# Patient Record
Sex: FEMALE | Race: WHITE | HISPANIC OR LATINO | Employment: UNEMPLOYED | URBAN - METROPOLITAN AREA
[De-identification: names, ages, dates, MRNs, and addresses within clinical notes are randomized per-mention and may not be internally consistent; named-entity substitution may affect disease eponyms.]

---

## 2017-08-15 ENCOUNTER — NON-PROVIDER VISIT (OUTPATIENT)
Dept: OBGYN | Facility: CLINIC | Age: 32
End: 2017-08-15

## 2017-08-15 DIAGNOSIS — Z32.01 POSITIVE PREGNANCY TEST: ICD-10-CM

## 2017-08-15 LAB
INT CON NEG: NEGATIVE
INT CON POS: POSITIVE
POC URINE PREGNANCY TEST: POSITIVE

## 2017-08-15 PROCEDURE — 81025 URINE PREGNANCY TEST: CPT | Performed by: OBSTETRICS & GYNECOLOGY

## 2017-09-14 ENCOUNTER — HOSPITAL ENCOUNTER (EMERGENCY)
Facility: MEDICAL CENTER | Age: 32
End: 2017-09-15
Attending: EMERGENCY MEDICINE

## 2017-09-14 DIAGNOSIS — Z33.1 PREGNANT STATE, INCIDENTAL: ICD-10-CM

## 2017-09-14 DIAGNOSIS — K08.89 PAIN, DENTAL: ICD-10-CM

## 2017-09-14 PROCEDURE — 700102 HCHG RX REV CODE 250 W/ 637 OVERRIDE(OP): Performed by: EMERGENCY MEDICINE

## 2017-09-14 PROCEDURE — A9270 NON-COVERED ITEM OR SERVICE: HCPCS | Performed by: EMERGENCY MEDICINE

## 2017-09-14 PROCEDURE — 99284 EMERGENCY DEPT VISIT MOD MDM: CPT

## 2017-09-14 RX ORDER — AMOXICILLIN 500 MG/1
500 CAPSULE ORAL 3 TIMES DAILY
Qty: 30 CAP | Refills: 0 | Status: SHIPPED | OUTPATIENT
Start: 2017-09-14

## 2017-09-14 RX ORDER — AMOXICILLIN 500 MG/1
500 CAPSULE ORAL ONCE
Status: COMPLETED | OUTPATIENT
Start: 2017-09-15 | End: 2017-09-14

## 2017-09-14 RX ADMIN — AMOXICILLIN 500 MG: 500 CAPSULE ORAL at 23:59

## 2017-09-15 VITALS
OXYGEN SATURATION: 97 % | BODY MASS INDEX: 37.5 KG/M2 | RESPIRATION RATE: 16 BRPM | HEART RATE: 82 BPM | HEIGHT: 63 IN | WEIGHT: 211.64 LBS | DIASTOLIC BLOOD PRESSURE: 72 MMHG | TEMPERATURE: 97.2 F | SYSTOLIC BLOOD PRESSURE: 110 MMHG

## 2017-09-15 NOTE — ED PROVIDER NOTES
"ED Provider Note    CHIEF COMPLAINT  Chief Complaint   Patient presents with   • Tooth Ache     LT upper molar pain x4 days. Took 2 PO tabs ampicilin at home today. She is 23 wks pregnant.       HPI  Kiet Monge is a 31 y.o. female who presentsFor evaluation of 4 days of left-sided upper dental pain, no trauma, no fever, no drainage. She took to ampicillin tablets that she happened to have at home and then presents here. Patient is pregnant, no abdominal pain or vaginal bleeding or discharge or any pregnancy related complaints. No neck pain or stiffness. No other complaints.    REVIEW OF SYSTEMS  Negative for fever, neck pain.    PAST MEDICAL HISTORY   has a past medical history of Pyelonephritis complicating pregnancy (2000).    SOCIAL HISTORY  Social History     Social History Main Topics   • Smoking status: Never Smoker   • Smokeless tobacco: Never Used   • Alcohol use No   • Drug use: No   • Sexual activity: Yes     Partners: Male      Comment: None       SURGICAL HISTORY  patient denies any surgical history    CURRENT MEDICATIONS  I personally reviewed the medication list in the charting documentation.     ALLERGIES  No Known Allergies    PHYSICAL EXAM  VITAL SIGNS: /69   Pulse 79   Temp 36.2 °C (97.2 °F) (Temporal)   Resp 16   Ht 1.6 m (5' 3\")   Wt 96 kg (211 lb 10.3 oz)   LMP 04/08/2017   SpO2 96%   Breastfeeding? No   BMI 37.49 kg/m²   Constitutional: Alert in no apparent distress.  HENT:Relatively normal-appearing dentition without significant decay, tenderness on percussion of the left maxillary molars, no obvious abscess.   Eyes: Conjunctiva normal, Non-icteric.   Chest: Normal nonlabored respirations  Skin: No erythema, No rash.   Musculoskeletal: Good range of motion in all major joints.   Neurologic: Alert, No focal deficits noted.   Psychiatric: Affect normal, Judgment normal.    COURSE & MEDICAL DECISION MAKING  Pertinent Labs & Imaging studies reviewed. (See chart for " details)    Encounter Summary: This is a 31 y.o. female with dental pain without a drainable abscess, incidentally pregnant. Will treat with amoxicillin, for pain control and instructed her to take Tylenol only. We'll provide her with a list of dental clinics, stable and appropriate for discharge at this time.      DISPOSITION: Discharge Home      FINAL IMPRESSION  1. Pain, dental    2. Pregnant state, incidental        This dictation was created using voice recognition software. The accuracy of the dictation is limited to the abilities of the software. I expect there may be some errors of grammar and possibly content. The nursing notes were reviewed and certain aspects of this information were incorporated into this note.    Electronically signed by: Paulino Palomares, 9/14/2017 11:56 PM

## 2017-09-15 NOTE — ED NOTES
"Chief Complaint   Patient presents with   • Tooth Ache     LT upper molar pain x4 days. Took 2 PO tabs ampicilin at home today. She is 23 wks pregnant.     Pt amb to triage with above.   No broken teeth noted, dental caries observed.   Unsure of fevers at home.   Pt returned to lobby. Educated on triage process and to inform staff of any changes. VSS.  /69   Pulse 79   Temp 36.2 °C (97.2 °F) (Temporal)   Resp 16   Ht 1.6 m (5' 3\")   Wt 96 kg (211 lb 10.3 oz)   LMP 04/20/2015   SpO2 96%   BMI 37.49 kg/m²       "

## 2017-09-15 NOTE — DISCHARGE INSTRUCTIONS
Dolor dental  (Dental Pain)  Las causas del dolor dental pueden ser muchas, entre ellas, las siguientes:  · Caries. La caries hace que el nervio del diente esté expuesto al aire y a las temperaturas calientes o frías. [place] puede causar dolor o molestias.  · Infección o absceso. Un absceso dental es un área llena de pus infectado debido a dez infección bacteriana en la parte interna del diente (pulpa). Por lo general, se produce en el extremo de la raíz del diente.  · Lesiones.  · Dez razón desconocida (idiopática).  El dolor puede ser leve o intenso. El dolor quizás aparezca solamente en estas ocasiones:  · Al masticar.  · Al estar expuesto a dez temperatura caliente o fría.  · Al comer alimentos o chacho bebidas con azúcar, por ejemplo, gaseosas o caramelos.  El dolor también puede ser bertha.  INSTRUCCIONES PARA EL CUIDADO EN EL HOGAR  Controle el dolor dental a fin de detectar algún cambio. Las siguientes medidas pueden servir para aliviar cualquier molestia que esté sintiendo:  · [place] los medicamentos solamente osbaldo se lo haya indicado el dentista.  · Si le recetaron antibióticos, asegúrese de terminarlos, incluso si comienza a sentirse mejor.  · Concurra a todas las visitas de control osbaldo se lo haya indicado el dentista. [place] es importante.  · No se aplique calor en la parte externa de la masoud.  · Si el dentista se lo ha indicado, enjuáguese la boca o kevan gárgaras con agua salada. [place] ayuda a aliviar el dolor y reducir la hinchazón.  ¨ Puede preparar agua salada agregando ¼ de cucharadita de sal en 1 taza de agua templada.  · Aplíquese hielo sobre la angela dolorida de la masoud:  ¨ Ponga el hielo en dez bolsa plástica.  ¨ Coloque dez toalla entre la piel y la bolsa de hielo.  ¨ Coloque el hielo barbara 20 minutos, 2 a 3 veces por día.  · Evite los alimentos o las bebidas que le causen dolor, osbaldo los siguientes:  ¨ Alimentos o bebidas muy calientes o muy fríos.  ¨ Alimentos o bebidas dulces o con  azúcar.  SOLICITE ATENCIÓN MÉDICA SI:  · El dolor no se ricardo con los medicamentos.  · Los síntomas empeoran.  · Aparecen nuevos síntomas.  SOLICITE ATENCIÓN MÉDICA DE INMEDIATO SI:  · No puede abrir la boca.  · Tiene problemas para respirar o tragar.  · Tiene fiebre.  · Tiene hinchazón en la masoud, el otis o la mandíbula.     Esta información no tiene osbaldo fin reemplazar el consejo del médico. Asegúrese de hacerle al médico cualquier pregunta que tenga.     Document Released: 12/18/2006 Document Revised: 05/03/2016  [employer] Interactive Patient Education ©2016 [employer]        Embarazo y medicamentos  (Pregnancy and Medications)  La mayor parte de las veces, los medicamentos que tanesha dez miguel embarazada no pasan al feto, nancy otras veces sí lo hacen. [place] puede causar lesiones o defectos congénitos. El riesgo de lesiones al feto es mayor en las primeras semanas del embarazo. En bushra momento es cuando se desarrollan los órganos principales. Si usted está tomando medicamentos prescriptos, de venta rayshawn o hierbas medicinales, es mejor que consulte a galvan médico antes de quedar embarazada. Si queda embarazada, deje de chacho los medicamentos de venta rayshawn y las hierbas medicinales inmediatamente. Comente con galvan médico lo que está tomando. También coméntele qué medicamentos ha tomado antes de saber que estaba embarazada. Nunca tome otros medicamentos daniel el embarazo, excepto que galvan médico la autorice.  Otras sustancias osbaldo cafeína, vitaminas, tés y hierbas medicinales pueden afectar el desarrollo del feto. [place] con galvan médico acerca de disminuir la cantidad de cafeína. También consulte qué tipo de vitaminas necesita recibir. Nunca use hierbas medicinales sin consultar a galvan médico.   MEDICAMENTOS CUYO USO NO ES SEGURO DANIEL EL EMBARAZO  · Categoría A  medicamentos cuya seguridad chatterjee sido probada daniel el embarazo y se chatterjee hallado que son seguros. [place] incluye medicamentos osbaldo:  · Ácido fólico  · Vitamina  B6  · Medicamentos para la tiroides con moderación o en dosis prescriptas.  · Categoría B  medicamentos que se morgan utilizado barbara el embarazo y no parecen causar defectos congénitos u otros problemas. [place] incluye medicamentos osbaldo:  · Algunos antibióticos  · Acetaminofeno (Tylenol ®).  · Aspartamo (endulzante artificial).  · Famotidina (Pepcid®).  · Prednisona (corticoides).  · Insulina (para la diabetes).  · Ibuprofeno (Advil®, Motrin®) antes del tercer trimestre. Las mujeres embarazadas no deben chacho ibuprofeno barbara los últimos gabi meses del embarazo.  · Categoría C  medicamentos que probablemente causen problemas a la madre o al feto. También incluye medicamentos cuyos estudios para verificar galvan seguridad no se morgan concluido. No hay estudios en curso para la mayoría de estos medicamentos. Estos medicamentos generalmente traen dez advertencia, por lo que sólo deben utilizarse si los beneficios son mayores que los riesgos. [place] es algo que dez miguel debe analizar cuidadosamente con galvan médico. Estos fármacos son:  · Proclorperzaina (Compazine®).  · Sudafed®.  · Fluconazol (Diflucan®).  · Ciprofloxacin (Cipro®).  · También se incluye en bushra анна a algunos antidepresivos.  · Categoría D  sustancias que claramente implican riesgos para la christoph del feto. Ellas son:  · Alcohol  · Litio (usado para tratar trastornos maníacodepresivos).  · Phenytoin (Dilantin®).  · La mayoría de la quimioterapia que se utiliza para el tratamiento del cáncer. En algunos casos, las drogas para la quimioterapia se administran barbara el embarazo.  · Categoría X  sustancias que morgan demostrado causar defectos congénitos. Nunca deben tomarse barbara el embarazo. Ellas son:  · Medicamentos para tratar problemas de la piel, osbaldo el acne quístico (Accutane®) y la psoriasis (Tegison® or Soriatane®).  · Talidomida  · Dietilestilbestrol o JENNY.  Ningún medicamento es considerado 100% seguro para ser utilizado barbara el embarazo, debido a  que cada persona reacciona de manera diferente.  No se recomienda daniel el embarazo el consumo de aspirina y otros medicamentos que contengan salicilato, especialmente daniel los últimos gabi meses, debido a que pueden causar hemorragias. En algunos casos raros, el médico podrá indicar estos medicamentos, bajo estrecha vigilancia. El acetilsalicilato es un ingrediente común en muchos analgésicos de venta rayshawn, y puede hacer que un embarazo dure más. Puede causar hemorragias graves antes y después del parto.   ¿SELVIN EVITAR HORTENSIA CUALQUIER MEDICAMENTO DANIEL EL EMBARAZO?   Si debe continuar o no tomando fernando medicamentes daniel el embarazo es tata cuestión seria. Sin embargo, si suspende el uso de los medicamentos que necesita, podría dañarse usted y el bebé. [place] con galvan médico para saber si los beneficios son mayores que los riesgos para usted y galvan bebé.   Las mujeres embarazadas y las que amamantan que necesitan medicamentos para enfermedades psiquiátricas deben consultar galvan obstetra, con el pediatra y con el profesional de la christoph mental antes de hortensia cualquier medicamento.  MEDICAMENTOS NATURALES O HIERBAS MEDICINALES DANIEL EL EMBARAZO  Aunque se hazel que algunas que medicinales pueden beneficiar daniel el embarazo, no existen estudios que prueben esta afirmación. Del mismo modo, existen muy pocos estudios sobre la seguridad y la efectividad de las hierbas medicinales daniel el embarazo. Nunca use hierbas medicinales sin consultar a galvan médico. Estos productos pueden contener agentes que le traigan problemas a usted y al feto en desarrollo. Pueden también causar problemas al embarazo.   Si hazel que galvan madre recibió dietilestilbesterol (JENNY), un estrógeno sintético, cuando estaba embarazada de usted, comuníquelo a galvan médico. Pregúntele:  · Qué tipo de análisis necesita.  · Con qué frecuencia debe realizarlos.  · Todo lo necesario para asegurarse que no tendrá ningún problema.  [place] miguel cuya madre  recibió JENNY daniel el embarazo debe ser controlada y evaluada para detectar anormalidades en fernando órganos femeninos daniel toda galvan amina.  MEDICAMENTOS DE VENTA BLADIMIR QUE SON SEGUROS PARA HORTENSIA DANIEL EL EMBARAZO:  Todo medicamento que se reciba daniel el embarazo debe ser siempre autorizado por el médico.  · Alergias: (Benadryl®).  · Resfríos y gripe: Tylenol (acetaminofeno). Tynenol Cold, gárgaras de agua tibia con sal, gotas nasales de solución salina.  · Constipación: (Metamucil®, Citrucil®, Fiberall/Fobricon, Colace®, Leche de Magnesia® , Senekot®).  · Diarrea (Kaopectate®, Immodium®, Parepectolin). No debe tomarlos en el primer trimestre del embarazo y sólo daniel 24 horas. Comuníquese con galvan médico si aún tiene diarrea.  · Dolor de sandra: (Tylenol).  · Ungüentos para moreno y raspaduras: (J & J, Bacitracin®, Neosporin®).  · Acidez: (Tums®, Riopan®, titralac , Gaviscon).  · Hemorroides: (Preparation H®, anusol, tucks®, Witch hazel).  · Náuseas y vómitos (Vitamina B6 comprimidos de 100mg, emetrol si no es diabética, Emetrex, Sea Bands).  · Erupciones (hidrocortisona en crema o ungüento, caladryl en loción o crema, benadryl en crema o cápsulas, shaq de asif).  · Infecciones en la vagina por hongos (Monistat® crema o comprimidos, Terazol® crema).  PARA OBTENER MÁS INFORMACIÓN  Para obtener más información relacionada con los medicamentos que tanesha y osbaldo pueden afectar galvan embarazo, visite la página web: http://www.drugs.com/drug_information.html  *Parte de esta información se basa en estudiso realizados por The Food and Drug Administration (FDA).  Document Released: 06/05/2009 Document Revised: 03/11/2013  [employer]® Patient Information ©2014 [employer].

## 2017-09-21 ENCOUNTER — INITIAL PRENATAL (OUTPATIENT)
Dept: OBGYN | Facility: CLINIC | Age: 32
End: 2017-09-21

## 2017-09-21 ENCOUNTER — HOSPITAL ENCOUNTER (OUTPATIENT)
Facility: MEDICAL CENTER | Age: 32
End: 2017-09-21
Attending: PHYSICIAN ASSISTANT
Payer: COMMERCIAL

## 2017-09-21 VITALS
WEIGHT: 209 LBS | SYSTOLIC BLOOD PRESSURE: 168 MMHG | HEIGHT: 63 IN | BODY MASS INDEX: 37.03 KG/M2 | DIASTOLIC BLOOD PRESSURE: 64 MMHG

## 2017-09-21 DIAGNOSIS — Z34.82 ENCOUNTER FOR SUPERVISION OF OTHER NORMAL PREGNANCY IN SECOND TRIMESTER: ICD-10-CM

## 2017-09-21 LAB
APPEARANCE UR: NORMAL
BILIRUB UR STRIP-MCNC: NORMAL MG/DL
COLOR UR AUTO: NORMAL
GLUCOSE UR STRIP.AUTO-MCNC: NEGATIVE MG/DL
KETONES UR STRIP.AUTO-MCNC: NEGATIVE MG/DL
LEUKOCYTE ESTERASE UR QL STRIP.AUTO: NORMAL
NITRITE UR QL STRIP.AUTO: NEGATIVE
PH UR STRIP.AUTO: 6.5 [PH] (ref 5–8)
PROT UR QL STRIP: NORMAL MG/DL
RBC UR QL AUTO: NEGATIVE
SP GR UR STRIP.AUTO: 1.02
UROBILINOGEN UR STRIP-MCNC: NORMAL MG/DL

## 2017-09-21 PROCEDURE — 81002 URINALYSIS NONAUTO W/O SCOPE: CPT | Performed by: PHYSICIAN ASSISTANT

## 2017-09-21 PROCEDURE — 59401 PR NEW OB VISIT: CPT | Performed by: PHYSICIAN ASSISTANT

## 2017-09-21 PROCEDURE — 8198 PREG CTR PKG RATE (SYSTEM): Performed by: PHYSICIAN ASSISTANT

## 2017-09-21 NOTE — PROGRESS NOTES
New ob visit. Pt sure of LMP. Pt has hx of 4  at term without complications. No GDM, PIH or delivery complications. Pt denies PMHx, Shx, though notes she had some depression with last pregnancy only. No tob, etoh or drug use. Taking PNV only. Pt currently denies cramping, bleeding or pain, though pt has discomfort in RLQ, likely due to too much activity and muscle strain. No N/V or constipation, diarrhea. +FM.     PE: See below. Size c/w dates. +FHT by Doppler    A/P: 1. IUP at 23w5d - PAP, GC/CT today. PNL, 1hr GTT slip given. US next avail. RTC 4 wks.  2. Late entry prenatal care

## 2017-09-21 NOTE — LETTER
Estudios Para Detectar los Portadores de la Fibrosis Quística   (La Enfermedad Fibroquística del Páncreas)    [facility]    Esta información esta relacionada con un examen de xavier que puede determinar si usted o galvan nikki es portador del gen que causa la enfermedad hereditaria que se llama la fibrosis quística.     ¿QUE ES LA ENFERMEDAD FIBROSIS QUÍSTICA?  · La  fibrosis quística es dez enfermedad hereditaria que afecta a más de 25,000 niños y jóvenes adultos americanos.  · Los síntomas de la fibrosis quística varían de dez persona a otra.  Los síntomas más comunes son congestión pulmonar, diarrea y retraso en el crecimiento del emily.  La mayoría de las personas con la fibrosis quística padecen de problemas médicos muy severos, y algunas mueren jóvenes.  Otras tienen tan pocos síntomas que no se percatan de que tienen fibrosis quística.  · La fibrosis quística no afecta en absoluto la inteligencia de la persona.  · Aunque actualmente no hay dez mega para la fibrosis quística, los científicos están avanzando con respecto a nuevos tratamientos y en la búsqueda de la mega.  Anteriormente la mayoría de las personas que padecían de fibrosis quística morían muy jóvenes.  Hoy en día, muchas personas que padecen de la fibrosis quística sobreviven hasta los 20 o 30 anos de edad.    ¿EXISTE LA POSIBILIDAD DE QUE MI YOBANY PUEDA TENER FIBROSIS QUÍSTICA?  · Usted puede tener un hijo con la fibrosis quística aun cuando no hay nadie en galvan yamil que la padezca.  ([place] la grafica que sigue.)  · Existe dez prueba que puede ayudarle a determinar si abel un gen para la fibrosis quística y si por eso corre un riesgo de tener un hijo con la enfermedad.  · Si ambos padres son portadores del gen para la fibrosis quística, hay dez (1) probabilidad entre 4 (25%) en cada embarazo, de que puedan tener un hijo afectada con fibrosis quística.  · Los portadores del gen para la fibrosis quística tienen dez copia del gen normal y  el otro gen alterado.  · Las personas con fibrosis quística tienen dos genes alterados para la fibrosis quística,  · Normalmente la mayoría de individuos tienen dos copias normales del gen para fibrosis quística.    Riesgo aproximado de que dez nikki sin familiares con fibrosis quística pueda tener un hijo con fibrosis quística:  Origen / Riesgo  Dez nikki Caucásica (de kendall vikas):  1 en 2,500  Dez nikki :  1 en 8,000  Dez nikki Afroamericana (de kendall kasey):  1 en 15,000  Dez nikki Asiática:  1 en 32,000    ¿EXISTEN PRUEBAS PARA DETECTAR LOS PORTADORES DE LA FIBROSIS QUÍSTICA?  · Hay dez prueba de xavier para determinar si usted o galvan nikki portan el gen para la fibrosis quística.  · Es importante entender que la prueba no detecta todos los portadores del gen para la fibrosis quística.  · Si la prueba determina que ambos padres con portadores, se puede realizar otras pruebas para determinar si galvan futuro hector esta afectado.    ¿CUANTO JAY LA PRUEBA PARA DETERMINAR SI SOY PORTADOR(A) DEL GEN PARA LA FIBROSIS QUÍSTICA?  · El costo de la prueba varia según galvan póliza de seguro medico y el laboratorio donde se efectúa el análisis.  · El costo promedio de la prueba es de $780.00  · Galvan consejera genética puede darle mas información acera de esta prueba para determinar si usted es portador de la fibrosis quística.    _____  Si, yo estoy interesada y me gustaria obtener mas información al respecto.  _____  No tengo interés ni en hacerme la prueba para determinar si soy portador(a) de gen para la fibrosis quística ni en recibir mas información al respecto.    Firma del paciente: _____________________________   9/21/2017

## 2017-09-21 NOTE — PROGRESS NOTES
Pt here for New OB today  Phone: [phone]  Pharmacy verified  Last pap/result:  Pt is taking PNV  Desires BTL  Pt c/o pelvic pain and Hendry Ames  Reports good FM, denies any VB and LOF   Pt c/o cough and running nose since yesterday; remedy list given; denies fever  Flu shot offered

## 2017-09-23 LAB
C TRACH DNA GENITAL QL NAA+PROBE: NEGATIVE
CYTOLOGY REG CYTOL: NORMAL
N GONORRHOEA DNA GENITAL QL NAA+PROBE: NEGATIVE
SPECIMEN SOURCE: NORMAL

## 2017-09-27 ENCOUNTER — APPOINTMENT (OUTPATIENT)
Dept: RADIOLOGY | Facility: IMAGING CENTER | Age: 32
End: 2017-09-27
Attending: PHYSICIAN ASSISTANT

## 2017-09-27 DIAGNOSIS — Z34.82 ENCOUNTER FOR SUPERVISION OF OTHER NORMAL PREGNANCY IN SECOND TRIMESTER: ICD-10-CM

## 2017-09-27 PROCEDURE — 76805 OB US >/= 14 WKS SNGL FETUS: CPT | Mod: 26 | Performed by: OBSTETRICS & GYNECOLOGY

## 2017-10-19 ENCOUNTER — ROUTINE PRENATAL (OUTPATIENT)
Dept: OBGYN | Facility: CLINIC | Age: 32
End: 2017-10-19

## 2017-10-19 VITALS — BODY MASS INDEX: 37.73 KG/M2 | WEIGHT: 213 LBS | DIASTOLIC BLOOD PRESSURE: 60 MMHG | SYSTOLIC BLOOD PRESSURE: 116 MMHG

## 2017-10-19 DIAGNOSIS — Z34.82 ENCOUNTER FOR SUPERVISION OF NORMAL PREGNANCY IN MULTIGRAVIDA IN SECOND TRIMESTER: Primary | ICD-10-CM

## 2017-10-19 PROCEDURE — 90040 PR PRENATAL FOLLOW UP: CPT | Performed by: NURSE PRACTITIONER

## 2017-10-19 PROCEDURE — 90472 IMMUNIZATION ADMIN EACH ADD: CPT | Performed by: NURSE PRACTITIONER

## 2017-10-19 PROCEDURE — 90686 IIV4 VACC NO PRSV 0.5 ML IM: CPT | Performed by: NURSE PRACTITIONER

## 2017-10-19 PROCEDURE — 90471 IMMUNIZATION ADMIN: CPT | Performed by: NURSE PRACTITIONER

## 2017-10-19 PROCEDURE — 90715 TDAP VACCINE 7 YRS/> IM: CPT | Performed by: NURSE PRACTITIONER

## 2017-10-19 NOTE — PROGRESS NOTES
OB f/u. + fetal movement.  No VB, LOF or UC's.  Good phone # [phone]  Preferred pharmacy confirmed.  1 hr GTT and PN Labs not done yet, order reprinted and given to patient  Kick count sheet given today.  Desires Flu Shot and TDap  Desires BTL

## 2017-10-19 NOTE — PROGRESS NOTES
TDap given to patient on L  Deltoid. Screening checklist reviewed with patient. VIS given    Flu Shot given to patient on R  Deltoid. Screening checklist reviewed with patient. VIS given

## 2017-10-19 NOTE — LETTER
Cuente los Movimientos de galvan Bebé  Otro paso importante para la christoph de galvan bebé    Kiet JanuszPollack     [facility]            Dept: [phone]    ¿Cuántas semanas tiene de embarazo? 27w5d    Fecha cuando tiene que comenzar a contar el movimiento: 10/21/17                  [place] debe usar bushra diagrama    Dez manera en que galvan doctor puede controlar a christoph de galvan bebé es sabiendo cuantas veces se mueve galvan bebé en el útero, o por medio de las “pataditas”.  Usted podrá ayudarle a galvan médico al usar cada día el siguiente diagrama.    Cada día, usted debe prestar atención a cuantas horas le lleva a galvan bebé moverse 10 veces.  Comience a contar en la mañana, lo antes posible después de haberse levantado.    · Primeramente, escriba la hora en que se mueve galvan bebé, hasta llegar a 10 veces.  · Colóquele un check o palomita a cada cuadrito cada vez que galvan bebé se mueva hasta que complete 10 veces.  · Escriba la hora cuando termine de contar 10 veces en la última columna.  · Sume el total del tiempo que le llevó contar los 10 movimientos.  · Finalmente, complete el cuadrito de cuantas horas le llevó hacerlo.    Después de dalia contado los 10 movimientos, ya no tendrá que contar los demás movimientos por el tova del día.  A la mañana siguiente, comience a contar de nuevo cuantas veces se mueve el bebé desde el momento en que se levante.    ¿Qué tendría que considerarse un “movimiento”?  Es difícil de decirlo porque es distinto de dez madre a otra, y de un embarazo a otro.  Lo importante es que cuente el movimiento de la misma manera barbara el transcurso de galvan embarazo.  Si tiene preguntas adicionales, pregúntele a galvan doctor.    ¡Cuente cuidadosamente cada día!     MUESTRA:  Semana 28    ¿Cuántas horas le ha llevado sentir 10 movimientos?        Hora de Inicio     1     2     3     4     5     6     7     8     9     10   Hora de Finlizar   Jaylyn. 8:20 ·  ·  ·  ·  ·  ·  ·  ·  ·  ·  11:40   Mar.               Mié.                Jue.               Vie.               Sáb.               Dom.                 IMPORTANTE:  Usted debe contactar a galvan doctor si le lleva más de 2 horas sentir 10 movimientos de galvan bebé.    Cada mañana, escriba la hora de inicio y comience a contar los movimientos de galvan bebé.  Hágalo colocándole un check o palomita a cada cuadrito cada vez que sienta un movimiento de galvan bebé.  Cuando haya sentido 10 “pataditas”, escriba la hora en que terminó de contar en la última columna.  Luego, complete en la cajita (arriba de la delio de check o palomita) el número total de horas que le llevó hacerlo.  Asegúrese de leer completamente las instrucciones en la página anterior.

## 2017-10-19 NOTE — PROGRESS NOTES
S:  Pt is  at 27w5d for routine OB follow up.  No concerns today.  Reports good FM.  Denies VB, LOF, RUCs or vaginal DC. Has not gotten pnls or 1 hr GTT done, pt understands she needs to get done before next visit    O:  Please see above vitals.        FHTs: 138        Fundal ht: 27 cm.        S=D        Complete OB US: normal fetal survaey.    A:  IUP at 27w5d  Patient Active Problem List    Diagnosis Date Noted   • Supervision of normal pregnancy 2011        P:  1.  Desires BTL.          2.  Instructions given on FKCs.          3.  Questions answered.          4.  Encouraged pt to tour L&D.          5.  Encourage adequate water intake.        6.   labor precautions reviewed.         7.  F/u 2 wks.        8.  TDap and flu vaccine today.        9.  Lab slips reprinted and given to pt.

## 2017-10-20 ENCOUNTER — HOSPITAL ENCOUNTER (OUTPATIENT)
Dept: LAB | Facility: MEDICAL CENTER | Age: 32
End: 2017-10-20
Attending: PHYSICIAN ASSISTANT
Payer: COMMERCIAL

## 2017-10-20 DIAGNOSIS — Z34.82 ENCOUNTER FOR SUPERVISION OF OTHER NORMAL PREGNANCY IN SECOND TRIMESTER: ICD-10-CM

## 2017-10-20 LAB
ABO GROUP BLD: NORMAL
APPEARANCE UR: ABNORMAL
BACTERIA #/AREA URNS HPF: ABNORMAL /HPF
BASOPHILS # BLD AUTO: 0.2 % (ref 0–1.8)
BASOPHILS # BLD: 0.02 K/UL (ref 0–0.12)
BILIRUB UR QL STRIP.AUTO: NEGATIVE
BLD GP AB SCN SERPL QL: NORMAL
COLOR UR: YELLOW
CULTURE IF INDICATED INDCX: YES UA CULTURE
EOSINOPHIL # BLD AUTO: 0.05 K/UL (ref 0–0.51)
EOSINOPHIL NFR BLD: 0.5 % (ref 0–6.9)
EPI CELLS #/AREA URNS HPF: ABNORMAL /HPF
ERYTHROCYTE [DISTWIDTH] IN BLOOD BY AUTOMATED COUNT: 44.1 FL (ref 35.9–50)
GLUCOSE 1H P 50 G GLC PO SERPL-MCNC: 104 MG/DL (ref 70–139)
GLUCOSE UR STRIP.AUTO-MCNC: NEGATIVE MG/DL
HBV SURFACE AG SER QL: NEGATIVE
HCT VFR BLD AUTO: 37.4 % (ref 37–47)
HGB BLD-MCNC: 12.2 G/DL (ref 12–16)
HIV 1+2 AB+HIV1 P24 AG SERPL QL IA: NON REACTIVE
HYALINE CASTS #/AREA URNS LPF: ABNORMAL /LPF
IMM GRANULOCYTES # BLD AUTO: 0.03 K/UL (ref 0–0.11)
IMM GRANULOCYTES NFR BLD AUTO: 0.3 % (ref 0–0.9)
KETONES UR STRIP.AUTO-MCNC: 80 MG/DL
LEUKOCYTE ESTERASE UR QL STRIP.AUTO: ABNORMAL
LYMPHOCYTES # BLD AUTO: 1.2 K/UL (ref 1–4.8)
LYMPHOCYTES NFR BLD: 12.4 % (ref 22–41)
MCH RBC QN AUTO: 29.4 PG (ref 27–33)
MCHC RBC AUTO-ENTMCNC: 32.6 G/DL (ref 33.6–35)
MCV RBC AUTO: 90.1 FL (ref 81.4–97.8)
MICRO URNS: ABNORMAL
MONOCYTES # BLD AUTO: 0.4 K/UL (ref 0–0.85)
MONOCYTES NFR BLD AUTO: 4.1 % (ref 0–13.4)
MUCOUS THREADS #/AREA URNS HPF: ABNORMAL /HPF
NEUTROPHILS # BLD AUTO: 7.94 K/UL (ref 2–7.15)
NEUTROPHILS NFR BLD: 82.5 % (ref 44–72)
NITRITE UR QL STRIP.AUTO: NEGATIVE
NRBC # BLD AUTO: 0 K/UL
NRBC BLD AUTO-RTO: 0 /100 WBC
PH UR STRIP.AUTO: 6 [PH]
PLATELET # BLD AUTO: 275 K/UL (ref 164–446)
PMV BLD AUTO: 11.5 FL (ref 9–12.9)
PROT UR QL STRIP: NEGATIVE MG/DL
RBC # BLD AUTO: 4.15 M/UL (ref 4.2–5.4)
RBC # URNS HPF: ABNORMAL /HPF
RBC UR QL AUTO: NEGATIVE
RH BLD: NORMAL
RUBV AB SER QL: 32.3 IU/ML
SP GR UR STRIP.AUTO: 1.03
TREPONEMA PALLIDUM IGG+IGM AB [PRESENCE] IN SERUM OR PLASMA BY IMMUNOASSAY: NON REACTIVE
UROBILINOGEN UR STRIP.AUTO-MCNC: 0.2 MG/DL
WBC # BLD AUTO: 9.6 K/UL (ref 4.8–10.8)
WBC #/AREA URNS HPF: ABNORMAL /HPF

## 2017-10-22 LAB
BACTERIA UR CULT: NORMAL
SIGNIFICANT IND 70042: NORMAL
SOURCE SOURCE: NORMAL

## 2017-11-03 ENCOUNTER — ROUTINE PRENATAL (OUTPATIENT)
Dept: OBGYN | Facility: CLINIC | Age: 32
End: 2017-11-03

## 2017-11-03 VITALS — SYSTOLIC BLOOD PRESSURE: 106 MMHG | DIASTOLIC BLOOD PRESSURE: 68 MMHG | BODY MASS INDEX: 37.91 KG/M2 | WEIGHT: 214 LBS

## 2017-11-03 DIAGNOSIS — Z34.80 SUPERVISION OF OTHER NORMAL PREGNANCY, ANTEPARTUM: ICD-10-CM

## 2017-11-03 PROCEDURE — 90040 PR PRENATAL FOLLOW UP: CPT | Performed by: NURSE PRACTITIONER

## 2017-11-17 ENCOUNTER — ROUTINE PRENATAL (OUTPATIENT)
Dept: OBGYN | Facility: CLINIC | Age: 32
End: 2017-11-17

## 2017-11-17 VITALS — SYSTOLIC BLOOD PRESSURE: 108 MMHG | WEIGHT: 211 LBS | DIASTOLIC BLOOD PRESSURE: 62 MMHG | BODY MASS INDEX: 37.38 KG/M2

## 2017-11-17 DIAGNOSIS — Z34.93 ENCOUNTER FOR SUPERVISION OF NORMAL PREGNANCY IN THIRD TRIMESTER, UNSPECIFIED GRAVIDITY: ICD-10-CM

## 2017-11-17 PROCEDURE — 90040 PR PRENATAL FOLLOW UP: CPT | Performed by: NURSE PRACTITIONER

## 2017-11-17 NOTE — PROGRESS NOTES
SUBJECTIVE:  Pt is a 32 y.o.   at 31w6d  gestation. Presents today for follow-up prenatal care. Reports no issues at this time.  Reports good  fetal movement. Denies cramping/contractions, bleeding or leaking of fluid. Denies dysuria, headaches, N/V, or other issues at this time. Generally feels well today.     OBJECTIVE:  - See prenatal vitals flow  Vitals:    17 1055   BP: 108/62   Weight: 95.7 kg (211 lb)               ASSESSMENT:   - IUP at 31w6d by LMP which is consistent with 23 week US   - S=D  Patient Active Problem List    Diagnosis Date Noted   • Supervision of normal pregnancy 2011         PLAN:  - S/sx pregnancy and labor warning signs vs general discomforts discussed  - Fetal movements and kick counts reviewed   - Adequate hydration reinforced  - Nutrition/exercise/vitamin education: continued PNV  - Plans for breast and formula feeding  - Plans BTL/LARC for contraception Pp: handout given and reviewed  - S/p TDAP vacc  - S/p Flu vacc  - Anticipatory guidance given  - RTC in 2 weeks for follow-up prenatal care

## 2017-11-17 NOTE — PROGRESS NOTES
OB f/u. + fetal movement.  No VB, LOF or UC's.  Good phone # [phone]  Preferred pharmacy confirmed  Pt reports no problems at this time

## 2017-12-01 ENCOUNTER — ROUTINE PRENATAL (OUTPATIENT)
Dept: OBGYN | Facility: CLINIC | Age: 32
End: 2017-12-01

## 2017-12-01 VITALS — WEIGHT: 215 LBS | SYSTOLIC BLOOD PRESSURE: 114 MMHG | BODY MASS INDEX: 38.09 KG/M2 | DIASTOLIC BLOOD PRESSURE: 82 MMHG

## 2017-12-01 DIAGNOSIS — Z34.80 SUPERVISION OF OTHER NORMAL PREGNANCY, ANTEPARTUM: Primary | ICD-10-CM

## 2017-12-01 PROCEDURE — 90040 PR PRENATAL FOLLOW UP: CPT | Performed by: NURSE PRACTITIONER

## 2017-12-01 NOTE — PROGRESS NOTES
Pt here today for OB follow up  Pt states having some pain in her upper abdomen.   Reports +  Good # [phone]  Pharmacy Confirmed.

## 2017-12-01 NOTE — PROGRESS NOTES
S:  Pt is  at 33w6d for routine OB follow up.  Reports upper abdominal pain.  Reports good FM.  Denies VB, LOF, RUCs or vaginal DC.    O:  Please see above vitals.        FHTs: 138        Fundal ht: 33 cm.        S=D    A:  IUP at 33w6d  Patient Active Problem List    Diagnosis Date Noted   • Supervision of normal pregnancy 2011        P:  1.  GBS @ 35 wks.          2.  Continue FKCs.          3.  Questions answered.          4.  Encouraged pt to tour L&D.          5.  Encourage adequate water intake.        6.  F/u 2 wks.        7.  Discussed decreasing red meat, fat and fried foods in diets, may take Pepcid 30 minutes before meals..

## 2017-12-01 NOTE — LETTER
December 1, 2017            Kiet Monge is currently being cared for at [facility].  This patient is currently 33 weeks and days gestation with a due date of 01/13/2017        Thank you,          SILAS Ruiz.

## 2017-12-01 NOTE — LETTER
December 1, 2017            Kiet Monge is currently being cared for at [facility].  This patient is currently 33 weeks and 6 days gestation with a due date of 01/13/2018      Thank you,          SILAS Ruiz.

## 2017-12-15 ENCOUNTER — HOSPITAL ENCOUNTER (OUTPATIENT)
Facility: MEDICAL CENTER | Age: 32
End: 2017-12-15
Attending: NURSE PRACTITIONER

## 2017-12-15 ENCOUNTER — ROUTINE PRENATAL (OUTPATIENT)
Dept: OBGYN | Facility: CLINIC | Age: 32
End: 2017-12-15

## 2017-12-15 VITALS — SYSTOLIC BLOOD PRESSURE: 100 MMHG | DIASTOLIC BLOOD PRESSURE: 88 MMHG | WEIGHT: 219 LBS | BODY MASS INDEX: 38.79 KG/M2

## 2017-12-15 DIAGNOSIS — Z34.80 SUPERVISION OF OTHER NORMAL PREGNANCY, ANTEPARTUM: ICD-10-CM

## 2017-12-15 PROCEDURE — 90040 PR PRENATAL FOLLOW UP: CPT | Performed by: NURSE PRACTITIONER

## 2017-12-15 NOTE — PROGRESS NOTES
Pt here today for OB follow up  Pt states having some cramping, pressure and feeling to push  Reports +LARISA Lugo # [phone]  Pharmacy Confirmed.  GBS today

## 2017-12-15 NOTE — PROGRESS NOTES
SUBJECTIVE:  Pt is a 32 y.o.   at 35w6d  gestation. Presents today for follow-up prenatal care. Reports no issues at this time.  Inquired with patient about depression due to patient hx. Both patient and her daughter state she is doing well. Reports good  fetal movement. Denies cramping/contractions, bleeding or leaking of fluid. Denies dysuria, headaches, N/V, or other issues at this time. Generally feels well today.     OBJECTIVE:  - See prenatal vitals flow  -   Vitals:    12/15/17 1323   BP: 100/88   Weight: 99.3 kg (219 lb)      - Pertinent Labs: normal prenatal panel, normal glucose. No AFP  - Pertinent ultrasound: Normal fetal survey           ASSESSMENT:   - IUP at 35w6d   - S=D   -   Patient Active Problem List    Diagnosis Date Noted   • Supervision of normal pregnancy 2011         PLAN:  - S/sx pregnancy and labor warning signs vs general discomforts discussed  - Fetal movements and kick counts reviewed   - Adequate hydration reinforced  - Nutrition/exercise/vitamin education; continued PNV  -  GBS done today.

## 2017-12-16 DIAGNOSIS — Z34.80 SUPERVISION OF OTHER NORMAL PREGNANCY, ANTEPARTUM: ICD-10-CM

## 2017-12-17 LAB — GP B STREP DNA SPEC QL NAA+PROBE: NEGATIVE

## 2017-12-22 ENCOUNTER — ROUTINE PRENATAL (OUTPATIENT)
Dept: OBGYN | Facility: CLINIC | Age: 32
End: 2017-12-22

## 2017-12-22 VITALS — WEIGHT: 219 LBS | BODY MASS INDEX: 38.79 KG/M2 | SYSTOLIC BLOOD PRESSURE: 100 MMHG | DIASTOLIC BLOOD PRESSURE: 70 MMHG

## 2017-12-22 DIAGNOSIS — Z34.80 SUPERVISION OF OTHER NORMAL PREGNANCY, ANTEPARTUM: ICD-10-CM

## 2017-12-22 PROCEDURE — 90040 PR PRENATAL FOLLOW UP: CPT | Performed by: NURSE PRACTITIONER

## 2017-12-22 NOTE — PROGRESS NOTES
Ob f/u. + fetal movement good  No VB, LOF or contractions   C/O pelvic and vaginal pain.  swollen in the vagina, hips pain    Phone number # [phone]  Pharmacy verified with patient  WT= 219 lbs             KR=328/70

## 2017-12-22 NOTE — LETTER
"Count Your Baby's Movements  Another step to a healthy delivery  Clemente Cruzy             Dept: [phone]    How Many Weeks Pregnant? 36w6d    Date to Begin Countin2017              How to use this chart    One way for your physician to keep track of your baby's health is by knowing how often the baby moves (or \"kicks\") in your womb.  You can help your physician to do this by using this chart every day.    Every day, you should see how many hours it takes for your baby to move 10 times.  Start in the morning, as soon as you get up.    · First, write down the time your baby moves until you get to 10.  · Check off one box every time your baby moves until you get to 10.  · Write down the time you finished counting in the last column.  · Total how long it took to count up all 10 movements.  · Finally, fill in the box that shows how long this took.  After counting 10 movements, you no longer have to count any more that day.  The next morning, just start counting again as soon as you get up.    What should you call a \"movement\"?  It is hard to say, because it will feel different from one mother to another and from one pregnancy to the next.  The important thing is that you count the movements the same way throughout your pregnancy.  If you have more questions, you should ask your physician.    Count carefully every day!  SAMPLE:  Week 28    How many hours did it take to feel 10 movements?       Start  Time     1     2     3     4     5     6     7     8     9     10   Finish Time   Mon 8:20 ·  ·  ·  ·  ·  ·  ·  ·  ·  ·  11:40                  Sat               Sun                 IMPORTANT: You should contact your physician if it takes more than two hours for you to feel 10 movements.  Each morning, write down the time and start to count the movements of your baby.  Keep track by checking off one box every time you feel one movement.  When you have " "felt 10 \"kicks\", write down the time you finished counting in the last column.  Then fill in the   box (over the check gerald) for the number of hours it took.  Be sure to read the complete instructions on the previous page.            "

## 2017-12-22 NOTE — PROGRESS NOTES
SUBJECTIVE:  Pt is a 32 y.o.   at 36w6d  gestation. Presents today for follow-up prenatal care. Reports no issues at this time.  Reports good  fetal movement. Denies cramping/contractions, bleeding or leaking of fluid. Denies dysuria, headaches, N/V, or other issues at this time. Generally feels well today. Feels swelling in vagina and hip pain, with upper abdominal muscle pain when the baby moves. Drinking less than a glass of water a day.     OBJECTIVE:  - See prenatal vitals flow  Vitals:    17 1358   BP: 100/70   Weight: 99.3 kg (219 lb)              ASSESSMENT:   - IUP at 36w6d by LMP which is consistent with 23 week US   - S=D   -   Patient Active Problem List    Diagnosis Date Noted   • Supervision of normal pregnancy 2011         PLAN:  - Increase water intake; reinforced kick counts as pt has not been doing them because she lost the paper; reprinted [facility] paper   - Nightly walks starting after tomorrow   - S/sx pregnancy and labor warning signs vs general discomforts discussed  - Fetal movements and kick counts reviewed   - Adequate hydration reinforced  - Nutrition/exercise/vitamin education; continued PNV  - S/p TDAP vacc  - S/p Flu vacc  - Anticipatory guidance given  - RTC in 1 week for follow-up prenatal care

## 2017-12-29 ENCOUNTER — ROUTINE PRENATAL (OUTPATIENT)
Dept: OBGYN | Facility: CLINIC | Age: 32
End: 2017-12-29

## 2017-12-29 VITALS — BODY MASS INDEX: 39.15 KG/M2 | DIASTOLIC BLOOD PRESSURE: 62 MMHG | WEIGHT: 221 LBS | SYSTOLIC BLOOD PRESSURE: 100 MMHG

## 2017-12-29 DIAGNOSIS — Z34.80 SUPERVISION OF OTHER NORMAL PREGNANCY, ANTEPARTUM: ICD-10-CM

## 2017-12-29 PROCEDURE — 90040 PR PRENATAL FOLLOW UP: CPT | Performed by: NURSE PRACTITIONER

## 2017-12-29 NOTE — PROGRESS NOTES
SUBJECTIVE:  Pt is a 32 y.o.   at 37w6d  gestation. Presents today for follow-up prenatal care. Reports no issues at this time except for pressure after urinating. She states no dysuria however. UC's feels mostly at noc. Reports good  fetal movement. Denies  bleeding or leaking of fluid. Denies  headaches, N/V, or other issues at this time. Generally feels well today.     OBJECTIVE:  - See prenatal vitals flow  -   Vitals:    17 1416   BP: 100/62   Weight: 100.2 kg (221 lb)      - Pertinent Labs: normal prenatal panel normal glucose No aft done.   - Pertinent ultrasound: Normal fetal survey            ASSESSMENT:   - IUP at 37w6d    - S=D   -   Patient Active Problem List    Diagnosis Date Noted   • Supervision of normal pregnancy 2011         PLAN:  - S/sx pregnancy and labor warning signs vs general discomforts discussed  - Fetal movements and kick counts reviewed   - Adequate hydration reinforced  - Nutrition/exercise/vitamin education; continued PNV  - Return to care 1 week.

## 2017-12-29 NOTE — PROGRESS NOTES
Ob f/u. + fetal movement   No VB, LOF or contractions   C/O lower back pain. Pelvic pressure   Phone number # [phone]  Pharmacy verified with patient  WT=   221 lbs           JH=393/62

## 2017-12-30 ENCOUNTER — HOSPITAL ENCOUNTER (INPATIENT)
Facility: MEDICAL CENTER | Age: 32
LOS: 1 days | End: 2017-12-31
Attending: OBSTETRICS & GYNECOLOGY | Admitting: OBSTETRICS & GYNECOLOGY
Payer: MEDICAID

## 2017-12-30 LAB
BASOPHILS # BLD AUTO: 0.2 % (ref 0–1.8)
BASOPHILS # BLD: 0.02 K/UL (ref 0–0.12)
EOSINOPHIL # BLD AUTO: 0.06 K/UL (ref 0–0.51)
EOSINOPHIL NFR BLD: 0.6 % (ref 0–6.9)
ERYTHROCYTE [DISTWIDTH] IN BLOOD BY AUTOMATED COUNT: 45.1 FL (ref 35.9–50)
ERYTHROCYTE [DISTWIDTH] IN BLOOD BY AUTOMATED COUNT: 45.6 FL (ref 35.9–50)
HCT VFR BLD AUTO: 26.4 % (ref 37–47)
HCT VFR BLD AUTO: 37.2 % (ref 37–47)
HGB BLD-MCNC: 12.2 G/DL (ref 12–16)
HGB BLD-MCNC: 8.5 G/DL (ref 12–16)
HOLDING TUBE BB 8507: NORMAL
IMM GRANULOCYTES # BLD AUTO: 0.06 K/UL (ref 0–0.11)
IMM GRANULOCYTES NFR BLD AUTO: 0.6 % (ref 0–0.9)
LYMPHOCYTES # BLD AUTO: 2.3 K/UL (ref 1–4.8)
LYMPHOCYTES NFR BLD: 22.1 % (ref 22–41)
MCH RBC QN AUTO: 27.6 PG (ref 27–33)
MCH RBC QN AUTO: 28 PG (ref 27–33)
MCHC RBC AUTO-ENTMCNC: 32.2 G/DL (ref 33.6–35)
MCHC RBC AUTO-ENTMCNC: 32.8 G/DL (ref 33.6–35)
MCV RBC AUTO: 85.3 FL (ref 81.4–97.8)
MCV RBC AUTO: 85.7 FL (ref 81.4–97.8)
MONOCYTES # BLD AUTO: 0.6 K/UL (ref 0–0.85)
MONOCYTES NFR BLD AUTO: 5.8 % (ref 0–13.4)
NEUTROPHILS # BLD AUTO: 7.35 K/UL (ref 2–7.15)
NEUTROPHILS NFR BLD: 70.7 % (ref 44–72)
NRBC # BLD AUTO: 0.02 K/UL
NRBC BLD-RTO: 0.2 /100 WBC
PLATELET # BLD AUTO: 185 K/UL (ref 164–446)
PLATELET # BLD AUTO: 285 K/UL (ref 164–446)
PMV BLD AUTO: 11.9 FL (ref 9–12.9)
PMV BLD AUTO: 12.3 FL (ref 9–12.9)
RBC # BLD AUTO: 3.08 M/UL (ref 4.2–5.4)
RBC # BLD AUTO: 4.36 M/UL (ref 4.2–5.4)
WBC # BLD AUTO: 10.4 K/UL (ref 4.8–10.8)
WBC # BLD AUTO: 10.4 K/UL (ref 4.8–10.8)

## 2017-12-30 PROCEDURE — 700101 HCHG RX REV CODE 250

## 2017-12-30 PROCEDURE — 36415 COLL VENOUS BLD VENIPUNCTURE: CPT

## 2017-12-30 PROCEDURE — 700111 HCHG RX REV CODE 636 W/ 250 OVERRIDE (IP): Performed by: OBSTETRICS & GYNECOLOGY

## 2017-12-30 PROCEDURE — 4A1HX4Z MONITORING OF PRODUCTS OF CONCEPTION, CARDIAC ELECTRICAL ACTIVITY, EXTERNAL APPROACH: ICD-10-PCS | Performed by: OBSTETRICS & GYNECOLOGY

## 2017-12-30 PROCEDURE — 85025 COMPLETE CBC W/AUTO DIFF WBC: CPT

## 2017-12-30 PROCEDURE — 303615 HCHG EPIDURAL/SPINAL ANESTHESIA FOR LABOR

## 2017-12-30 PROCEDURE — 700111 HCHG RX REV CODE 636 W/ 250 OVERRIDE (IP)

## 2017-12-30 PROCEDURE — 700102 HCHG RX REV CODE 250 W/ 637 OVERRIDE(OP): Performed by: OBSTETRICS & GYNECOLOGY

## 2017-12-30 PROCEDURE — 59409 OBSTETRICAL CARE: CPT

## 2017-12-30 PROCEDURE — 700112 HCHG RX REV CODE 229: Performed by: STUDENT IN AN ORGANIZED HEALTH CARE EDUCATION/TRAINING PROGRAM

## 2017-12-30 PROCEDURE — A9270 NON-COVERED ITEM OR SERVICE: HCPCS | Performed by: OBSTETRICS & GYNECOLOGY

## 2017-12-30 PROCEDURE — 700105 HCHG RX REV CODE 258: Performed by: OBSTETRICS & GYNECOLOGY

## 2017-12-30 PROCEDURE — A9270 NON-COVERED ITEM OR SERVICE: HCPCS | Performed by: STUDENT IN AN ORGANIZED HEALTH CARE EDUCATION/TRAINING PROGRAM

## 2017-12-30 PROCEDURE — 770002 HCHG ROOM/CARE - OB PRIVATE (112)

## 2017-12-30 PROCEDURE — 10907ZC DRAINAGE OF AMNIOTIC FLUID, THERAPEUTIC FROM PRODUCTS OF CONCEPTION, VIA NATURAL OR ARTIFICIAL OPENING: ICD-10-PCS | Performed by: OBSTETRICS & GYNECOLOGY

## 2017-12-30 PROCEDURE — 85027 COMPLETE CBC AUTOMATED: CPT

## 2017-12-30 PROCEDURE — 304965 HCHG RECOVERY SERVICES

## 2017-12-30 RX ORDER — IBUPROFEN 600 MG/1
600 TABLET ORAL EVERY 6 HOURS PRN
Status: DISCONTINUED | OUTPATIENT
Start: 2017-12-30 | End: 2017-12-31 | Stop reason: HOSPADM

## 2017-12-30 RX ORDER — SODIUM CHLORIDE, SODIUM LACTATE, POTASSIUM CHLORIDE, CALCIUM CHLORIDE 600; 310; 30; 20 MG/100ML; MG/100ML; MG/100ML; MG/100ML
INJECTION, SOLUTION INTRAVENOUS CONTINUOUS
Status: DISPENSED | OUTPATIENT
Start: 2017-12-30 | End: 2017-12-30

## 2017-12-30 RX ORDER — METHYLERGONOVINE MALEATE 0.2 MG/ML
0.2 INJECTION INTRAVENOUS
Status: DISCONTINUED | OUTPATIENT
Start: 2017-12-30 | End: 2017-12-31 | Stop reason: HOSPADM

## 2017-12-30 RX ORDER — MISOPROSTOL 200 UG/1
800 TABLET ORAL
Status: COMPLETED | OUTPATIENT
Start: 2017-12-30 | End: 2017-12-30

## 2017-12-30 RX ORDER — SODIUM CHLORIDE, SODIUM LACTATE, POTASSIUM CHLORIDE, CALCIUM CHLORIDE 600; 310; 30; 20 MG/100ML; MG/100ML; MG/100ML; MG/100ML
INJECTION, SOLUTION INTRAVENOUS PRN
Status: DISCONTINUED | OUTPATIENT
Start: 2017-12-30 | End: 2017-12-31 | Stop reason: HOSPADM

## 2017-12-30 RX ORDER — DOCUSATE SODIUM 100 MG/1
100 CAPSULE, LIQUID FILLED ORAL 2 TIMES DAILY PRN
Status: DISCONTINUED | OUTPATIENT
Start: 2017-12-30 | End: 2017-12-31 | Stop reason: HOSPADM

## 2017-12-30 RX ORDER — ROPIVACAINE HYDROCHLORIDE 2 MG/ML
INJECTION, SOLUTION EPIDURAL; INFILTRATION; PERINEURAL
Status: ACTIVE
Start: 2017-12-30 | End: 2017-12-30

## 2017-12-30 RX ORDER — OXYCODONE HYDROCHLORIDE AND ACETAMINOPHEN 5; 325 MG/1; MG/1
1 TABLET ORAL EVERY 4 HOURS PRN
Status: DISCONTINUED | OUTPATIENT
Start: 2017-12-30 | End: 2017-12-31 | Stop reason: HOSPADM

## 2017-12-30 RX ORDER — MISOPROSTOL 200 UG/1
600 TABLET ORAL
Status: DISCONTINUED | OUTPATIENT
Start: 2017-12-30 | End: 2017-12-31 | Stop reason: HOSPADM

## 2017-12-30 RX ORDER — OXYCODONE AND ACETAMINOPHEN 10; 325 MG/1; MG/1
1 TABLET ORAL EVERY 4 HOURS PRN
Status: DISCONTINUED | OUTPATIENT
Start: 2017-12-30 | End: 2017-12-31 | Stop reason: HOSPADM

## 2017-12-30 RX ORDER — VITAMIN A ACETATE, BETA CAROTENE, ASCORBIC ACID, CHOLECALCIFEROL, .ALPHA.-TOCOPHEROL ACETATE, DL-, THIAMINE MONONITRATE, RIBOFLAVIN, NIACINAMIDE, PYRIDOXINE HYDROCHLORIDE, FOLIC ACID, CYANOCOBALAMIN, CALCIUM CARBONATE, FERROUS FUMARATE, ZINC OXIDE, CUPRIC OXIDE 3080; 12; 120; 400; 1; 1.84; 3; 20; 22; 920; 25; 200; 27; 10; 2 [IU]/1; UG/1; MG/1; [IU]/1; MG/1; MG/1; MG/1; MG/1; MG/1; [IU]/1; MG/1; MG/1; MG/1; MG/1; MG/1
1 TABLET, FILM COATED ORAL EVERY MORNING
Status: DISCONTINUED | OUTPATIENT
Start: 2017-12-30 | End: 2017-12-31 | Stop reason: HOSPADM

## 2017-12-30 RX ADMIN — OXYTOCIN: 10 INJECTION, SOLUTION INTRAMUSCULAR; INTRAVENOUS at 08:45

## 2017-12-30 RX ADMIN — MISOPROSTOL 800 MCG: 200 TABLET ORAL at 08:40

## 2017-12-30 RX ADMIN — FENTANYL CITRATE 100 MCG: 50 INJECTION INTRAMUSCULAR; INTRAVENOUS at 04:20

## 2017-12-30 RX ADMIN — DOCUSATE SODIUM 100 MG: 100 CAPSULE ORAL at 23:08

## 2017-12-30 RX ADMIN — SODIUM CHLORIDE, POTASSIUM CHLORIDE, SODIUM LACTATE AND CALCIUM CHLORIDE: 600; 310; 30; 20 INJECTION, SOLUTION INTRAVENOUS at 07:49

## 2017-12-30 RX ADMIN — Medication 20 UNITS: at 10:00

## 2017-12-30 RX ADMIN — SODIUM CHLORIDE, POTASSIUM CHLORIDE, SODIUM LACTATE AND CALCIUM CHLORIDE: 600; 310; 30; 20 INJECTION, SOLUTION INTRAVENOUS at 05:51

## 2017-12-30 RX ADMIN — FENTANYL CITRATE 100 MCG: 50 INJECTION INTRAMUSCULAR; INTRAVENOUS at 03:19

## 2017-12-30 RX ADMIN — OXYCODONE HYDROCHLORIDE AND ACETAMINOPHEN 1 TABLET: 10; 325 TABLET ORAL at 23:08

## 2017-12-30 RX ADMIN — IBUPROFEN 600 MG: 600 TABLET, FILM COATED ORAL at 23:08

## 2017-12-30 RX ADMIN — OXYCODONE HYDROCHLORIDE AND ACETAMINOPHEN 1 TABLET: 10; 325 TABLET ORAL at 14:04

## 2017-12-30 RX ADMIN — IBUPROFEN 600 MG: 600 TABLET, FILM COATED ORAL at 10:00

## 2017-12-30 ASSESSMENT — LIFESTYLE VARIABLES
EVER_SMOKED: NEVER
EVER_SMOKED: NEVER
ALCOHOL_USE: NO
DO YOU DRINK ALCOHOL: NO

## 2017-12-30 ASSESSMENT — PATIENT HEALTH QUESTIONNAIRE - PHQ9
1. LITTLE INTEREST OR PLEASURE IN DOING THINGS: NOT AT ALL
SUM OF ALL RESPONSES TO PHQ QUESTIONS 1-9: 0
2. FEELING DOWN, DEPRESSED, IRRITABLE, OR HOPELESS: NOT AT ALL
SUM OF ALL RESPONSES TO PHQ9 QUESTIONS 1 AND 2: 0

## 2017-12-30 ASSESSMENT — PAIN SCALES - GENERAL
PAINLEVEL_OUTOF10: 6
PAINLEVEL_OUTOF10: 6
PAINLEVEL_OUTOF10: 7
PAINLEVEL_OUTOF10: 3
PAINLEVEL_OUTOF10: 0
PAINLEVEL_OUTOF10: 10

## 2017-12-30 NOTE — DELIVERY NOTE
VAGINAL DELIVERY PRODEDURE NOTE    PATIENT ID:  NAME:  Kiet Monge  MRN:               [id number]  YOB: 1985    On 2017 at 0829, this 32 y.o., now 38w0d , GBS negative female delivered via  under epidural anesthesia a viable female infant not yet weighed with APGAR scores of 8 and 9 at one and five minutes. There was no nuchal cord and infant was bulb suctioned at delivery. Cord was doubly clamped, cut and infant handed to RN in attendance. An intact placenta was delivered spontaneously at 0845 with 3 vessel cord. Upon vaginal exam, there was no laceration. Estimated blood loss was 400cc. Patient was given IV Pitocin and 800mcg Cytotec. Patient and infant will be transferred to postpartum in stable condition.    Gloria Mccall M.D.    Delivery attended by Dr. Juhi Monroe who was present for the entire delivery.

## 2017-12-30 NOTE — H&P
History and Physical    Kiet Monge is a 32 y.o. female  -Para:     Gestational Age:  38w0d  Admitted for:   Active Labor  Admitted to  [facility].  [facility]    HPI: Patient is admitted with the above mentioned Chief Complaint and States   Loss of fluid:   negative  Abdominal Pain:  negative  Uterine Contractions:  positive  Vaginal Bleeding:  negative  Fetal Movement:  normal  Patient denies fever, chills, nausea, vomiting , headache, visual disturbance, or dysuria  Patient's last menstrual period was 2017 (exact date).  Estimated Date of Delivery: 18  Final SALVADOR: 2018, by Last Menstrual Period    Patient Active Problem List    Diagnosis Date Noted   • Supervision of normal pregnancy 2011       Admitting DX: Pregnancy  Labor and delivery indication for care or intervention  Pregnancy Complications:  none  OB Risk Factors:   none  Labor State:    Active phase labor.    History:   has a past medical history of Pyelonephritis complicating pregnancy (). She also has no past medical history of Blood transfusion without reported diagnosis or EMPHYSEMA.     has no past surgical history on file.    OB History    Para Term  AB Living   5 4 4     4   SAB TAB Ectopic Molar Multiple Live Births             4      # Outcome Date GA Lbr Carson/2nd Weight Sex Delivery Anes PTL Lv   5 Current            4 Term 16 37w0d  2.807 kg (6 lb 3 oz) F Vag-Spont  N TANI      Birth Comments: No complications   3 Term 11 39w0d  3.317 kg (7 lb 5 oz) M Vag-Spont EPI N TANI      Birth Comments: No complications   2 Term 04 40w0d 03:00 3.487 kg (7 lb 11 oz) F Vag-Spont None N TANI      Birth Comments: denies complications, bottlefed.    1 Term 00 38w0d 12:00 3.033 kg (6 lb 11 oz) F Vag-Spont None N TANI      Birth Comments: pyelonephritis, bottlefed.           Medications:  No current facility-administered  "medications on file prior to encounter.      Current Outpatient Prescriptions on File Prior to Encounter   Medication Sig Dispense Refill   • Prenatal MV-Min-Fe Fum-FA-DHA (PRENATAL 1 PO) Take  by mouth.         Allergies:  Patient has no known allergies.    ROS:   Neuro: negative    Cardiovascular: negative  Gastro intestinal: negative  Genitourinary: negative            Physical Exam:  /81   Pulse 96   Temp 36.1 °C (97 °F)   Ht 1.6 m (5' 3\")   Wt 100.2 kg (221 lb)   LMP 04/08/2017 (Exact Date)   BMI 39.15 kg/m²   Constitutional: healthy-appearing, Well-developed, well-nourished, in no acute distress  No JVD: while supine  HEENT: EOMI and Neck supple with midline trachea  Breast Exam: negative  Cardio: regular rate and rhythm  Lung: unlabored respirations, no intercostal retractions or accessory muscle use, clear to auscultation without rales or wheezes  Abdomen: abdomen is soft without significant tenderness, masses, organomegaly or guarding  Extremity: extremities, peripheral pulses and reflexes normal, no edema, redness or tenderness in the calves or thighs, feet normal, good pulses, normal color, temperature and sensation    Cervical Exam: 60%  Cervix Dilatation: 6  Station: negative 2  Pelvis: Adequate  Fetal Assessment: Fetal heart variability: moderate  Fetal Heart Rate decelerations: none  Fetal Heart Rate accelerations: yes  Baseline FHR: 130 per minute  Uterine contractions: regular, every 2-5 minutes  Estimated Fetal Weight: 3000 - 3500g      Labs:  Recent Labs      12/30/17   0245   WBC  10.4   RBC  4.36   HEMOGLOBIN  12.2   HEMATOCRIT  37.2   MCV  85.3   MCH  28.0   MCHC  32.8*   RDW  45.1   PLATELETCT  285   MPV  12.3     Prenatal Results     1st Trimester     Test Value Date Time    ABO O  10/20/17 1525    RH POS  10/20/17 1525    Antibody NEG  10/20/17 1525    CBC/PLT/DIFF       HGB 12.2 g/dL 12/30/17 0245    Platelets 285 K/uL 12/30/17 0245    HGB A1C        1 Hr  mg/dL 10/20/17 " 1525    3 Hr GTT       Rubella 32.30 IU/mL 10/20/17 1525    RPR Non Reactive  11 1641    Urine Culture Mixed skin sasha ,000 cfu/mL  10/20/17 1525    24 Urine Protein        24 Urine Creatinine        HBsAg Negative  10/20/17 1525    Hep CAB        HIV       Gonorrhea Negative  11 1609    Chlamydia Negative  11 1609    TSH        Free T4         TB       Pap       SYPHILUS TREP QUAL [id number] [[id number]][             2nd Trimester     Test Value Date Time    HCT 37.2 % 17 0245    HGB 12.2 g/dL 17 0245    1 Hr  mg/dL 10/20/17 1525    3 Hr GTT             24-28 Weeks     Test Value Date Time    1 Hr GCT  104 mg/dL 10/20/17 1525    TSH        Free T4        24 Urine Protein       24 Urine Creatinine       BUN       Creatinine       GFR       AST       ALT       Uric Acid       LDH             3rd Trimester     Test Value Date Time    HCT 37.2 % 17 0245    HGB 12.2 g/dL 17 0245    TSH       Free T4       24 Hr Urine Protein       24 Hr Urine Creatinine       SYPHILUS TREP QUAL Non Reactive  10/20/17 1525          35-37 Weeks     Test Value Date Time    GBS PCR LB Negative  12/15/17 1345    GBS PCR Negative  12/15/17 1345          Genetic Screening     Test Value Date Time    Cystic Fibrosis       AFP Quad Normal  09/21/15 0953    Sickle Cell                     Assessment:  Gestational Age:  38w0d  Labor State:   Labor, Active  Risk Factors:   none  Pregnancy Complications: none    Patient Active Problem List    Diagnosis Date Noted   • Supervision of normal pregnancy 2011       Plan:   Admitted for: Active Labor    CBC  routine urinalysis  Antibiotics: Not indicated at this time  GBS negative  Epidural PRN  Anticipate     Dr Palacios is the attending today    Mandy Kay C.N.M.

## 2017-12-30 NOTE — PROGRESS NOTES
In to see patient. Comfortable with epidural  SVE done, -/-2. AROM performed with clear fluid noted.  FHT's category 1, mod variability, pos accels, neg decels  TOCO irregular UC's, palpate moderate.   POC discussed with patient  Anticipate .    Mandy Kay CNM

## 2017-12-30 NOTE — PROGRESS NOTES
3306 - Received report from JONAH Aguilar RN. Assumed care. Pt resting in bed. Daughter at bedside holding baby. Pt has breakfast ordered. Pt reports mild pain during fundal rub and request oral pain meds.   1000 - Ibupofen given, per pt's request.   1100 - Pt up to bathroom and voided. Stable on feet with standby assist. Pt back to bed, as PP bed not available at this time. Pt has not need at this time.  1260 - Called report to Sissy TUCKER. All questions answered. Pt then transferred by  to  accompanied by VIDHYA Luque RN.

## 2017-12-30 NOTE — PROGRESS NOTES
Pt up from L&D via wheelchair by CAITLIN Flores. Matilda RN called report prior to pt' arrival. Assessment complete. VSS. Fundus firm, lochia light. Pt oriented to room, call light, emergency light, skylight. Call light within reach. Will continue to monitor.

## 2017-12-30 NOTE — PROGRESS NOTES
0245 Report from Nathalia TUCKER.  POC discussed.     0250 [employer] employee ID [id number] on  Ipad used for admit profile.  Pt educated on pain control options.  Pt would like fentanyl when available and is also requesting an epidural.      0600 Mandy Kay CNM at bedside for assessment.      0655 Report to Kayley CARRILLO RN.  POC discussed.

## 2017-12-31 VITALS
WEIGHT: 221 LBS | DIASTOLIC BLOOD PRESSURE: 66 MMHG | SYSTOLIC BLOOD PRESSURE: 106 MMHG | HEIGHT: 63 IN | HEART RATE: 88 BPM | BODY MASS INDEX: 39.16 KG/M2 | TEMPERATURE: 97.4 F | OXYGEN SATURATION: 97 % | RESPIRATION RATE: 17 BRPM

## 2017-12-31 PROCEDURE — A9270 NON-COVERED ITEM OR SERVICE: HCPCS | Performed by: STUDENT IN AN ORGANIZED HEALTH CARE EDUCATION/TRAINING PROGRAM

## 2017-12-31 PROCEDURE — 700102 HCHG RX REV CODE 250 W/ 637 OVERRIDE(OP): Performed by: STUDENT IN AN ORGANIZED HEALTH CARE EDUCATION/TRAINING PROGRAM

## 2017-12-31 PROCEDURE — 700102 HCHG RX REV CODE 250 W/ 637 OVERRIDE(OP): Performed by: OBSTETRICS & GYNECOLOGY

## 2017-12-31 PROCEDURE — A9270 NON-COVERED ITEM OR SERVICE: HCPCS | Performed by: OBSTETRICS & GYNECOLOGY

## 2017-12-31 RX ORDER — IBUPROFEN 600 MG/1
600 TABLET ORAL EVERY 6 HOURS PRN
Qty: 30 TAB | Refills: 3 | Status: SHIPPED | OUTPATIENT
Start: 2017-12-31

## 2017-12-31 RX ORDER — LANOLIN ALCOHOL/MO/W.PET/CERES
325 CREAM (GRAM) TOPICAL
Qty: 90 TAB | Refills: 3 | Status: SHIPPED | OUTPATIENT
Start: 2017-12-31

## 2017-12-31 RX ORDER — PSEUDOEPHEDRINE HCL 30 MG
100 TABLET ORAL 2 TIMES DAILY PRN
Qty: 60 CAP | Refills: 2 | Status: SHIPPED | OUTPATIENT
Start: 2017-12-31

## 2017-12-31 RX ADMIN — IBUPROFEN 600 MG: 600 TABLET, FILM COATED ORAL at 06:32

## 2017-12-31 RX ADMIN — Medication 1 TABLET: at 08:58

## 2017-12-31 RX ADMIN — OXYCODONE HYDROCHLORIDE AND ACETAMINOPHEN 1 TABLET: 10; 325 TABLET ORAL at 06:32

## 2017-12-31 ASSESSMENT — PAIN SCALES - GENERAL
PAINLEVEL_OUTOF10: 0
PAINLEVEL_OUTOF10: 9

## 2017-12-31 NOTE — PROGRESS NOTES
Patient assessment is complete, wnl. Fundus is firm with minimal discharge. Patient ambulating and voiding without difficulty. Bonding well with infant. bottle feeding well. Plan of care discussed with patient. Questions answered. Encouraged to call with needs. Will monitor.

## 2017-12-31 NOTE — DISCHARGE SUMMARY
Discharge Summary:      Kiet oMnge      Admit Date:   2017  Discharge Date:  2017     Admitting diagnosis:  Pregnancy  Labor and delivery indication for care or intervention  Discharge Diagnosis: Status post vaginal delivery  Pregnancy Complications: none  Tubal Ligation:  no        History:  Past Medical History:   Diagnosis Date   • Pyelonephritis complicating pregnancy      OB History    Para Term  AB Living   5 4 4     4   SAB TAB Ectopic Molar Multiple Live Births             4      # Outcome Date GA Lbr Carson/2nd Weight Sex Delivery Anes PTL Lv   5 Current            4 Term 16 37w0d  2.807 kg (6 lb 3 oz) F Vag-Spont  N TANI      Birth Comments: No complications   3 Term 11 39w0d  3.317 kg (7 lb 5 oz) M Vag-Spont EPI N TANI      Birth Comments: No complications   2 Term 04 40w0d 03:00 3.487 kg (7 lb 11 oz) F Vag-Spont None N TANI      Birth Comments: denies complications, bottlefed.    1 Term 00 38w0d 12:00 3.033 kg (6 lb 11 oz) F Vag-Spont None N TANI      Birth Comments: pyelonephritis, bottlefed.            Patient has no known allergies.  Patient Active Problem List    Diagnosis Date Noted   • Supervision of normal pregnancy 2011        Hospital Course:   32 y.o. , now para 5, was admitted with the above mentioned diagnosis, underwent Active Labor, vaginal, spontaneous. Patient postpartum course was unremarkable, with progressive advancement in diet , ambulation and toleration of oral analgesia. Patient without complaints today and desires discharge.      Vitals:    17 0940 17 1215 17 1600 17   BP: 114/71 109/61 112/71 123/76   Pulse: 67 88 70 87   Resp:  20 18 18   Temp:  36.8 °C (98.3 °F) 36.4 °C (97.6 °F) 36.8 °C (98.2 °F)   SpO2:  97% 98% 97%   Weight:       Height:           Current Facility-Administered Medications   Medication Dose   • oxytocin (PITOCIN) infusion (for postpartum)  2,000 mL/hr    Followed  by   • oxytocin (PITOCIN) infusion (for postpartum)   mL/hr   • ibuprofen (MOTRIN) tablet 600 mg  600 mg   • oxycodone-acetaminophen (PERCOCET) 5-325 MG per tablet 1 Tab  1 Tab   • oxycodone-acetaminophen (PERCOCET-10)  MG per tablet 1 Tab  1 Tab   • LR infusion     • PRN oxytocin (PITOCIN) (20 Units/1000 mL) PRN for excessive uterine bleeding - See Admin Instr  125-999 mL/hr   • misoprostol (CYTOTEC) tablet 600 mcg  600 mcg   • methylergonovine (METHERGINE) injection 0.2 mg  0.2 mg   • docusate sodium (COLACE) capsule 100 mg  100 mg   • magnesium hydroxide (MILK OF MAGNESIA) suspension 30 mL  30 mL   • prenatal plus vitamin (STUARTNATAL 1+1) 27-1 MG tablet 1 Tab  1 Tab       Exam:  Breast Exam: negative  Abdomen: Abdomen soft, non-tender. BS normal. No masses,  No organomegaly  Fundus Non Tender: yes  Perineum: perineum intact  Extremity: extremities, peripheral pulses and reflexes normal     Labs:  Recent Labs      12/30/17   0245  12/30/17   1822   WBC  10.4  10.4   RBC  4.36  3.08*   HEMOGLOBIN  12.2  8.5*   HEMATOCRIT  37.2  26.4*   MCV  85.3  85.7   MCH  28.0  27.6   MCHC  32.8*  32.2*   RDW  45.1  45.6   PLATELETCT  285  185   MPV  12.3  11.9        Activity:   Discharge to home  Pelvic Rest x 6 weeks    Assessment:  normal postpartum course  Discharge Assessment: Taking adequate diet and fluids     Follow up: .TPC 6 weeks for vaginal        Discharge Meds:   Current Outpatient Prescriptions   Medication Sig Dispense Refill   • ibuprofen (MOTRIN) 600 MG Tab Take 1 Tab by mouth every 6 hours as needed (For cramping after delivery; do not give if patient is receiving ketorolac (Toradol)). 30 Tab 3   • docusate sodium 100 MG Cap Take 100 mg by mouth 2 times a day as needed for Constipation. 60 Cap 2   • ferrous sulfate 325 (65 Fe) MG EC tablet Take 1 Tab by mouth 3 times a day, with meals. 90 Tab 3       Mabel Pollack M.D.

## 2017-12-31 NOTE — CARE PLAN
Problem: Potential for postpartum infection related to presence of episiotomy/vaginal tear and/or uterine contamination  Goal: Patient will be absent from signs and symptoms of infection  Outcome: PROGRESSING AS EXPECTED  No signs of infection noted.

## 2017-12-31 NOTE — DISCHARGE INSTRUCTIONS
POSTPARTUM DISCHARGE INSTRUCTIONS FOR MOM    YOB: 1985   Age: 32 y.o.               Admit Date: 2017     Discharge Date: 2017  Attending Doctor:  Romel Palacios M.D.                  Allergies:  Patient has no known allergies.    Discharged to home by car. Discharged via wheelchair, hospital escort: Yes.  Special equipment needed: Not Applicable  Belongings with: Personal  Be sure to schedule a follow-up appointment with your primary care doctor or any specialists as instructed.     Discharge Plan:        REASONS TO CALL YOUR OBSTETRICIAN:  1.   Persistent fever or shaking chills (Temperature higher than 100.4)  2.   Heavy bleeding (soaking more than 1 pad per hour); Passing clots  3.   Foul odor from vagina  4.   Mastitis (Breast infection; breast pain, chills, fever, redness)  5.   Urinary pain, burning or frequency  6.   Episiotomy infection  7.   Abdominal incision infection  8.   Severe depression longer than 24 hours    HAND WASHING  · Prior to handling the baby.  · Before breastfeeding or bottle feeding baby.  · After using the bathroom or changing the baby's diaper.    WOUND CARE  Ask your physician for additional care instructions.  In general:    ·  Incision:      · Keep clean and dry.    · Do NOT lift anything heavier than your baby for up to 6 weeks.    · There should not be any opening or pus.      VAGINAL CARE  · Nothing inside vagina for 6 weeks: no sexual intercourse, tampons or douching.  · Bleeding may continue for 2-4 weeks.  Amount may vary.    · Call your physician for heavy bleeding which means soaking more than 1 pad per hour    BIRTH CONTROL  · It is possible to become pregnant at any time after delivery and while breastfeeding.  · Plan to discuss a method of birth control with your physician at your follow up visit. visit.    DIET AND ELIMINATION  · Eating more fiber (bran cereal, fruits, and vegetables) and drinking plenty of fluids will help to avoid  "constipation.  · Urinary frequency after childbirth is normal.    POSTPARTUM BLUES  During the first few days after birth, you may experience a sense of the \"blues\" which may include impatience, irritability or even crying.  These feeling come and go quickly.  However, as many as 1 in 10 women experience emotional symptoms known as postpartum depression.    Postpartum depression:  May start as early as the second or third day after delivery or take several weeks or months to develop.  Symptoms of \"blues\" are present, but are more intense:  Crying spells; loss of appetite; feelings of hopelessness or loss of control; fear of touching the baby; over concern or no concern at all about the baby; little or no concern about your own appearance/caring for yourself; and/or inability to sleep or excessive sleeping.  Contact your physician if you are experiencing any of these symptoms.    Crisis Hotline:  · [place] Crisis Hotline:  2-149-FQJFTVF  Or 1-501.822.1998  · [place] Crisis Hotline:  1-234.904.1798  Or 849-008-5713    PREVENTING SHAKEN BABY:  If you are angry or stressed, PUT THE BABY IN THE CRIB, step away, take some deep breaths, and wait until you are calm to care for the baby.  DO NOT SHAKE THE BABY.  You are not alone, call a supporter for help.    · Crisis Call Center 24/7 crisis line 179-452-5603 or 1-875.750.9845  · You can also text them, text \"ANSWER\" to 249025    QUIT SMOKING/TOBACCO USE:  I understand the use of any tobacco products increases my chance of suffering from future heart disease and could cause other illnesses which may shorten my life. Quitting the use of tobacco products is the single most important thing I can do to improve my health. For further information on smoking / tobacco cessation call a Toll Free Quit Line at 1-434.966.2836 (*[employer] [place]) or 1-160.619.8841 (American Lung Association) or you can access the web based program at www.lungusa.org.    · [place] Tobacco Users " Help Line:  (666) 577-2842       Toll Free: 1-816.880.6393  · Quit Tobacco Program On license of [facility] (393)226-1793    DEPRESSION / SUICIDE RISK:  As you are discharged from this [facility], it is important to learn how to keep safe from harming yourself.    Recognize the warning signs:  · Abrupt changes in personality, positive or negative- including increase in energy   · Giving away possessions  · Change in eating patterns- significant weight changes-  positive or negative  · Change in sleeping patterns- unable to sleep or sleeping all the time   · Unwillingness or inability to communicate  · Depression  · Unusual sadness, discouragement and loneliness  · Talk of wanting to die  · Neglect of personal appearance   · Rebelliousness- reckless behavior  · Withdrawal from people/activities they love  · Confusion- inability to concentrate     If you or a loved one observes any of these behaviors or has concerns about self-harm, here's what you can do:  · Talk about it- your feelings and reasons for harming yourself  · Remove any means that you might use to hurt yourself (examples: pills, rope, extension cords, firearm)  · Get professional help from the community (Mental Health, Substance Abuse, psychological counseling)  · Do not be alone:Call your Safe Contact- someone whom you trust who will be there for you.  · Call your local CRISIS HOTLINE 595-9427 or 667-857-0923  · Call your local Children's Mobile Crisis Response Team [place] (889) 625-4344 or www.[employer]  · Call the toll free National Suicide Prevention Hotlines   · National Suicide Prevention Lifeline 775-069-BUNG (7645)  · National Hope Line Network 800-SUICIDE (555-4725)    DISCHARGE SURVEY:  Thank you for choosing [facility].  We hope we provided you with very good care.  You may be receiving a survey in the mail.  Please fill it out.  Your opinion is valuable to us.    ADDITIONAL EDUCATIONAL MATERIALS GIVEN TO  PATIENT:        My signature on this form indicates that:  1.  I have reviewed and understand the above information  2.  My questions regarding this information have been answered to my satisfaction.  3.  I have formulated a plan with my discharge nurse to obtain my prescribed medication for home.

## 2023-03-11 ENCOUNTER — HOSPITAL ENCOUNTER (EMERGENCY)
Facility: MEDICAL CENTER | Age: 38
End: 2023-03-11
Attending: EMERGENCY MEDICINE
Payer: COMMERCIAL

## 2023-03-11 VITALS
RESPIRATION RATE: 18 BRPM | WEIGHT: 223.55 LBS | TEMPERATURE: 97 F | HEIGHT: 62 IN | BODY MASS INDEX: 41.14 KG/M2 | SYSTOLIC BLOOD PRESSURE: 106 MMHG | HEART RATE: 77 BPM | DIASTOLIC BLOOD PRESSURE: 56 MMHG | OXYGEN SATURATION: 97 %

## 2023-03-11 DIAGNOSIS — R20.2 PARESTHESIAS: ICD-10-CM

## 2023-03-11 PROCEDURE — 99282 EMERGENCY DEPT VISIT SF MDM: CPT

## 2023-03-11 NOTE — DISCHARGE INSTRUCTIONS
Follow-up with primary care next week for reevaluation, to establish care.    Diet and activity per routine.    Return to the emergency department for headache, visual changes, facial droop or weakness, persistent or worsening paresthesias, altered mental status, fever, difficulty swallowing or breathing or other new concerns.

## 2023-03-11 NOTE — ED NOTES
Assist RN: Provided pt with water and crackers for PO challenge per ERP. Pt resting in bed, NADN, Call light in reach.

## 2023-03-11 NOTE — ED TRIAGE NOTES
Chief Complaint   Patient presents with    Numbness     Patient states her face, and lips became numb this morning and when she going to bed this evening her tongue also became numb.      Patient is able to speak in full sentences with the help of  services, a/o x4. Patient very tearful during triage and worried she is having a stroke. Negative stroke assessment.

## 2023-03-11 NOTE — ED PROVIDER NOTES
ED Provider Note    CHIEF COMPLAINT  Chief Complaint   Patient presents with    Numbness     Patient states her face, and lips became numb this morning and when she going to bed this evening her tongue also became numb.       EXTERNAL RECORDS REVIEWED  Inpatient Notes discharge summary from postpartum unit 2017.    HPI/ROS  LIMITATION TO HISTORY   Select: Language Mongolian,  Used friend at bedside, iPad declined  OUTSIDE HISTORIAN(S):  Family /friend    Kiet Monge is a 37 y.o. female who presents to the emergency department through triage with family members for intermittent numbness.  Patient states that yesterday morning, patient felt as though her left face was swollen.  Patient states she also noticed some tingling at the tip of her tongue and lower lip bilaterally.  This improved over time but recurred again early this morning.  No headache, visual changes, but patient feels some tingling in her left eye occasionally as well.  No recurrent facial swelling.  Numbness only persist at the tip of her tongue now.  No neck pain or stiffness.  No focal weakness or extremity paresthesias.  No fever or chills.  No rash or cellulitis.  No skin changes, hives.    Patient denies oral swelling, difficulty swallowing or breathing.    States that she had some congestion yesterday and took a Benadryl before the symptoms began.  Later took Advil without improvement.  No known allergies.  No new foods, medications or exposures.  No sick contacts.    PAST MEDICAL HISTORY   has a past medical history of Pyelonephritis complicating pregnancy (2000).    SURGICAL HISTORY  patient denies any surgical history    FAMILY HISTORY  Family History   Problem Relation Age of Onset    Diabetes Father     No Known Problems Mother     Cancer Maternal Grandfather         lung    Cancer Paternal Grandmother         bones    No Known Problems Sister     No Known Problems Brother        SOCIAL HISTORY  Social History     Tobacco Use  "   Smoking status: Never    Smokeless tobacco: Never   Substance and Sexual Activity    Alcohol use: No    Drug use: No    Sexual activity: Yes     Partners: Male     Comment: None       CURRENT MEDICATIONS  Home Medications       Reviewed by Chapis Nelson R.N. (Registered Nurse) on 03/11/23 at 0112  Med List Status: Partial     Medication Last Dose Status   amoxicillin (AMOXIL) 500 MG Cap  Active   docusate sodium 100 MG Cap  Active   ferrous sulfate 325 (65 Fe) MG EC tablet  Active   ibuprofen (MOTRIN) 600 MG Tab  Active   Prenatal MV-Min-Fe Fum-FA-DHA (PRENATAL 1 PO)  Active                    ALLERGIES  No Known Allergies    PHYSICAL EXAM  VITAL SIGNS: /73   Pulse 76   Temp 36.9 °C (98.4 °F) (Temporal)   Resp 19   Ht 1.575 m (5' 2\")   Wt 101 kg (223 lb 8.7 oz)   LMP 02/15/2023 (Exact Date)   SpO2 97%   BMI 40.89 kg/m²    Pulse ox interpretation: I interpret this pulse ox as normal.  Constitutional: Alert in no apparent distress.  HENT: Normocephalic, atraumatic. Bilateral external ears normal, Nose normal.  No facial swelling, edema, discoloration or crepitus.  No tongue swelling or lip swelling or discoloration.  No mucous membrane changes, lesions.  No stridor or dysphonia.  Eyes: Pupils are equal and reactive, Conjunctiva normal.  EOMI, no nystagmus  Neck: Normal range of motion, Supple  Lymphatic: No lymphadenopathy noted.   Cardiovascular: Regular rate and rhythm, no murmurs. Distal pulses intact.    Thorax & Lungs: Normal peripheral perfusion nonlabored respirations  Skin: Warm, Dry  Musculoskeletal: Good range of motion in all major joints.   Neurologic: Alert and orient x4.  Speech is clear and cohesive.  Cranial nerves II through XII intact bilaterally.  5 out of 5 strength in 4 extremities with proximal distal muscle groups.  Believes independently.  Psychiatric: Affect normal, Judgment normal, Mood normal.       COURSE & MEDICAL DECISION MAKING    ED Observation Status? No; " Patient does not meet criteria for ED Observation.     INITIAL ASSESSMENT, COURSE AND PLAN  Care Narrative:   Seen evaluated at bedside for nonspecific left facial discomfort or swelling and paresthesias.  Paresthesias include bilateral lower lip and tip of her tongue.  Occurred early yesterday then self-limiting, recurred again this morning but have all resolved except for the tip of the tongue.  Denies new exposures, known allergies.  Denies cutaneous changes, rash.  Denies difficulty swallowing or breathing.  Hemodynamically stable.  Neurologically intact and nonfocal.  Exam is otherwise unremarkable and not objective and reproducible.      DISPOSITION AND DISCUSSIONS    Nonspecific paresthesias that wax and wane pattern unlikely a stroke.  No history for electrolyte derangement or vitamin deficiency.  Dynamically stable.  No indication for labs or imaging at this time.    The patient is stable for discharge home, anticipatory guidance provide, close follow-up is encouraged and strict return instructions have been discussed. Patient is agreeable to the disposition and plan.      FINAL DIAGNOSIS  1. Paresthesias           Electronically signed by: Chichi Palacios D.O., 3/11/2023 3:22 AM

## 2023-05-14 ENCOUNTER — APPOINTMENT (OUTPATIENT)
Dept: RADIOLOGY | Facility: MEDICAL CENTER | Age: 38
End: 2023-05-14
Attending: EMERGENCY MEDICINE

## 2023-05-14 ENCOUNTER — HOSPITAL ENCOUNTER (EMERGENCY)
Facility: MEDICAL CENTER | Age: 38
End: 2023-05-14
Attending: EMERGENCY MEDICINE

## 2023-05-14 VITALS
HEIGHT: 62 IN | WEIGHT: 215.39 LBS | BODY MASS INDEX: 39.64 KG/M2 | OXYGEN SATURATION: 97 % | SYSTOLIC BLOOD PRESSURE: 121 MMHG | DIASTOLIC BLOOD PRESSURE: 73 MMHG | TEMPERATURE: 97.7 F | HEART RATE: 60 BPM | RESPIRATION RATE: 18 BRPM

## 2023-05-14 DIAGNOSIS — K80.50 BILIARY COLIC: ICD-10-CM

## 2023-05-14 LAB
ALBUMIN SERPL BCP-MCNC: 3.9 G/DL (ref 3.2–4.9)
ALBUMIN/GLOB SERPL: 1.3 G/DL
ALP SERPL-CCNC: 133 U/L (ref 30–99)
ALT SERPL-CCNC: 24 U/L (ref 2–50)
ANION GAP SERPL CALC-SCNC: 13 MMOL/L (ref 7–16)
APPEARANCE UR: CLEAR
AST SERPL-CCNC: 19 U/L (ref 12–45)
BASOPHILS # BLD AUTO: 0.2 % (ref 0–1.8)
BASOPHILS # BLD: 0.02 K/UL (ref 0–0.12)
BILIRUB SERPL-MCNC: 0.2 MG/DL (ref 0.1–1.5)
BILIRUB UR QL STRIP.AUTO: NEGATIVE
BUN SERPL-MCNC: 13 MG/DL (ref 8–22)
CALCIUM ALBUM COR SERPL-MCNC: 8.6 MG/DL (ref 8.5–10.5)
CALCIUM SERPL-MCNC: 8.5 MG/DL (ref 8.5–10.5)
CHLORIDE SERPL-SCNC: 103 MMOL/L (ref 96–112)
CO2 SERPL-SCNC: 21 MMOL/L (ref 20–33)
COLOR UR: YELLOW
CREAT SERPL-MCNC: 0.62 MG/DL (ref 0.5–1.4)
EOSINOPHIL # BLD AUTO: 0.23 K/UL (ref 0–0.51)
EOSINOPHIL NFR BLD: 2.4 % (ref 0–6.9)
ERYTHROCYTE [DISTWIDTH] IN BLOOD BY AUTOMATED COUNT: 43.3 FL (ref 35.9–50)
GFR SERPLBLD CREATININE-BSD FMLA CKD-EPI: 116 ML/MIN/1.73 M 2
GLOBULIN SER CALC-MCNC: 3.1 G/DL (ref 1.9–3.5)
GLUCOSE SERPL-MCNC: 120 MG/DL (ref 65–99)
GLUCOSE UR STRIP.AUTO-MCNC: NEGATIVE MG/DL
HCG SERPL QL: NEGATIVE
HCT VFR BLD AUTO: 39.2 % (ref 37–47)
HGB BLD-MCNC: 12.7 G/DL (ref 12–16)
IMM GRANULOCYTES # BLD AUTO: 0.01 K/UL (ref 0–0.11)
IMM GRANULOCYTES NFR BLD AUTO: 0.1 % (ref 0–0.9)
KETONES UR STRIP.AUTO-MCNC: ABNORMAL MG/DL
LEUKOCYTE ESTERASE UR QL STRIP.AUTO: NEGATIVE
LIPASE SERPL-CCNC: 32 U/L (ref 11–82)
LYMPHOCYTES # BLD AUTO: 2.57 K/UL (ref 1–4.8)
LYMPHOCYTES NFR BLD: 26.6 % (ref 22–41)
MCH RBC QN AUTO: 28.9 PG (ref 27–33)
MCHC RBC AUTO-ENTMCNC: 32.4 G/DL (ref 33.6–35)
MCV RBC AUTO: 89.3 FL (ref 81.4–97.8)
MICRO URNS: ABNORMAL
MONOCYTES # BLD AUTO: 0.5 K/UL (ref 0–0.85)
MONOCYTES NFR BLD AUTO: 5.2 % (ref 0–13.4)
NEUTROPHILS # BLD AUTO: 6.32 K/UL (ref 2–7.15)
NEUTROPHILS NFR BLD: 65.5 % (ref 44–72)
NITRITE UR QL STRIP.AUTO: NEGATIVE
NRBC # BLD AUTO: 0 K/UL
NRBC BLD-RTO: 0 /100 WBC
PH UR STRIP.AUTO: 6 [PH] (ref 5–8)
PLATELET # BLD AUTO: 350 K/UL (ref 164–446)
PMV BLD AUTO: 10.6 FL (ref 9–12.9)
POTASSIUM SERPL-SCNC: 3.7 MMOL/L (ref 3.6–5.5)
PROT SERPL-MCNC: 7 G/DL (ref 6–8.2)
PROT UR QL STRIP: NEGATIVE MG/DL
RBC # BLD AUTO: 4.39 M/UL (ref 4.2–5.4)
RBC UR QL AUTO: NEGATIVE
SODIUM SERPL-SCNC: 137 MMOL/L (ref 135–145)
SP GR UR STRIP.AUTO: 1.04
UROBILINOGEN UR STRIP.AUTO-MCNC: 1 MG/DL
WBC # BLD AUTO: 9.7 K/UL (ref 4.8–10.8)

## 2023-05-14 PROCEDURE — 80053 COMPREHEN METABOLIC PANEL: CPT

## 2023-05-14 PROCEDURE — 99284 EMERGENCY DEPT VISIT MOD MDM: CPT

## 2023-05-14 PROCEDURE — 84703 CHORIONIC GONADOTROPIN ASSAY: CPT

## 2023-05-14 PROCEDURE — 36415 COLL VENOUS BLD VENIPUNCTURE: CPT

## 2023-05-14 PROCEDURE — 81003 URINALYSIS AUTO W/O SCOPE: CPT

## 2023-05-14 PROCEDURE — 85025 COMPLETE CBC W/AUTO DIFF WBC: CPT

## 2023-05-14 PROCEDURE — 96376 TX/PRO/DX INJ SAME DRUG ADON: CPT

## 2023-05-14 PROCEDURE — 83690 ASSAY OF LIPASE: CPT

## 2023-05-14 PROCEDURE — 700111 HCHG RX REV CODE 636 W/ 250 OVERRIDE (IP): Performed by: EMERGENCY MEDICINE

## 2023-05-14 PROCEDURE — 76705 ECHO EXAM OF ABDOMEN: CPT

## 2023-05-14 PROCEDURE — 96374 THER/PROPH/DIAG INJ IV PUSH: CPT

## 2023-05-14 RX ADMIN — FENTANYL CITRATE 50 MCG: 50 INJECTION, SOLUTION INTRAMUSCULAR; INTRAVENOUS at 22:00

## 2023-05-14 RX ADMIN — FENTANYL CITRATE 50 MCG: 50 INJECTION, SOLUTION INTRAMUSCULAR; INTRAVENOUS at 20:30

## 2023-05-14 ASSESSMENT — PAIN DESCRIPTION - PAIN TYPE
TYPE: ACUTE PAIN

## 2023-05-15 NOTE — ED NOTES
Pt ambulatory back to room with steady gait. Pt placed into gown and placed on the monitor.  Pt complaints  abdominal pain and increasing pain today.  Alert and oriented, not in any form of respiratory distress noted.      No

## 2023-05-15 NOTE — ED TRIAGE NOTES
"Kiet Pollack  Chief Complaint   Patient presents with    Abdominal Pain     RUQ, pt has known hx of gall stones, has severe pain today and pain intermittent for the last several months. +n/v           Pt is alert and oriented, speaking in full sentences, follows commands and responds appropriately to questions. NAD. Resp are even and unlabored.      Pt placed in lobby and educated on triage process. Pt encouraged to alert staff for any changes.     Patient and staff wearing appropriate PPE    /85   Pulse 67   Temp 36.7 °C (98 °F) (Temporal)   Resp 16   Ht 1.575 m (5' 2\")   Wt 97.7 kg (215 lb 6.2 oz)   SpO2 100%   BMI 39.40 kg/m²     "

## 2023-05-15 NOTE — ED PROVIDER NOTES
ED Provider Note    CHIEF COMPLAINT  Chief Complaint   Patient presents with    Abdominal Pain     RUQ, pt has known hx of gall stones, has severe pain today and pain intermittent for the last several months. +n/v        EXTERNAL RECORDS REVIEWED  Inpatient Notes [facility] ER note from July 31, 2022 reviewed and patient was seen for abdominal pain.  Unrelated to days with the patient was additionally seen in the ER on March 2023 for complaint of facial paresthesias.    HPI/ROS  LIMITATION TO HISTORY   Select: : None [place] with daughter at bedside  OUTSIDE HISTORIAN(S):  Family daughter at bedside providing history and translation    Kiet Pollack is a 37 y.o. female who presents to the emergency department for recurrent abdominal pain.  Past medical history as document below.  Daughter at bedside explains that the patient was seen at [facility] roughly 1 year ago and diagnosed with gallstones.  She had follow-up with outpatient specialist and was told to continue to wait and watch for symptoms but could continue to consider elective gallbladder removal.  She continues to have intermittent pain but most the time it is relieved.  Tonight she had onset of recurrent discomfort around 11 AM.  However became significantly worse this evening after eating a hamburger at a family gathering for Mother's Day.  She currently she is in moderate to severe discomfort.  There is some radiation towards her back and mostly in the midepigastrium right upper quadrant.  She has had associated nausea and vomiting with this episode.      No diarrhea or constipation.  No urinary symptoms.  No lower abdominal pain.  No flank pain.  Denies any chest pain or shortness of breath.  PAST MEDICAL HISTORY   has a past medical history of Pyelonephritis complicating pregnancy (2000).    SURGICAL HISTORY  patient denies any surgical history    FAMILY HISTORY  Family History   Problem Relation Age of Onset    Diabetes Father     No Known  "Problems Mother     Cancer Maternal Grandfather         lung    Cancer Paternal Grandmother         bones    No Known Problems Sister     No Known Problems Brother        SOCIAL HISTORY  Social History     Tobacco Use    Smoking status: Never    Smokeless tobacco: Never   Substance and Sexual Activity    Alcohol use: No    Drug use: No    Sexual activity: Yes     Partners: Male     Comment: None       CURRENT MEDICATIONS  Home Medications    **Home medications have not yet been reviewed for this encounter**         ALLERGIES  No Known Allergies    PHYSICAL EXAM  VITAL SIGNS: /73   Pulse 60   Temp 36.5 °C (97.7 °F) (Temporal)   Resp 18   Ht 1.575 m (5' 2\")   Wt 97.7 kg (215 lb 6.2 oz)   SpO2 97%   BMI 39.40 kg/m²      Pulse ox interpretation: I interpret this pulse ox as normal.  Constitutional: Alert in no apparent distress.  HENT: No signs of trauma, Bilateral external ears normal, Nose normal.   Eyes: Pupils are equal and reactive  Neck: Normal range of motion, No tenderness, Supple  Cardiovascular: Regular rate and rhythm, no murmurs.   Thorax & Lungs: Normal breath sounds, No respiratory distress, No wheezing, No chest tenderness.   Abdomen: Bowel sounds normal, Soft, midepigastric and right upper quadrant tenderness with positive Flood's  Skin: Warm, Dry, No erythema, No rash.   Back: No bony tenderness, No CVA tenderness.   Extremities: Intact distal pulses  Musculoskeletal: Good range of motion in all major joints. No tenderness to palpation or major deformities noted.   Neurologic: Alert , Normal motor function, Normal sensory function, No focal deficits noted.   Psychiatric: Affect normal, Judgment normal, Mood normal.         DIAGNOSTIC STUDIES / PROCEDURES    LABS  Results for orders placed or performed during the hospital encounter of 05/14/23   CBC WITH DIFFERENTIAL   Result Value Ref Range    WBC 9.7 4.8 - 10.8 K/uL    RBC 4.39 4.20 - 5.40 M/uL    Hemoglobin 12.7 12.0 - 16.0 g/dL    " Hematocrit 39.2 37.0 - 47.0 %    MCV 89.3 81.4 - 97.8 fL    MCH 28.9 27.0 - 33.0 pg    MCHC 32.4 (L) 33.6 - 35.0 g/dL    RDW 43.3 35.9 - 50.0 fL    Platelet Count 350 164 - 446 K/uL    MPV 10.6 9.0 - 12.9 fL    Neutrophils-Polys 65.50 44.00 - 72.00 %    Lymphocytes 26.60 22.00 - 41.00 %    Monocytes 5.20 0.00 - 13.40 %    Eosinophils 2.40 0.00 - 6.90 %    Basophils 0.20 0.00 - 1.80 %    Immature Granulocytes 0.10 0.00 - 0.90 %    Nucleated RBC 0.00 /100 WBC    Neutrophils (Absolute) 6.32 2.00 - 7.15 K/uL    Lymphs (Absolute) 2.57 1.00 - 4.80 K/uL    Monos (Absolute) 0.50 0.00 - 0.85 K/uL    Eos (Absolute) 0.23 0.00 - 0.51 K/uL    Baso (Absolute) 0.02 0.00 - 0.12 K/uL    Immature Granulocytes (abs) 0.01 0.00 - 0.11 K/uL    NRBC (Absolute) 0.00 K/uL   COMP METABOLIC PANEL   Result Value Ref Range    Sodium 137 135 - 145 mmol/L    Potassium 3.7 3.6 - 5.5 mmol/L    Chloride 103 96 - 112 mmol/L    Co2 21 20 - 33 mmol/L    Anion Gap 13.0 7.0 - 16.0    Glucose 120 (H) 65 - 99 mg/dL    Bun 13 8 - 22 mg/dL    Creatinine 0.62 0.50 - 1.40 mg/dL    Calcium 8.5 8.5 - 10.5 mg/dL    AST(SGOT) 19 12 - 45 U/L    ALT(SGPT) 24 2 - 50 U/L    Alkaline Phosphatase 133 (H) 30 - 99 U/L    Total Bilirubin 0.2 0.1 - 1.5 mg/dL    Albumin 3.9 3.2 - 4.9 g/dL    Total Protein 7.0 6.0 - 8.2 g/dL    Globulin 3.1 1.9 - 3.5 g/dL    A-G Ratio 1.3 g/dL   LIPASE   Result Value Ref Range    Lipase 32 11 - 82 U/L   HCG QUAL SERUM   Result Value Ref Range    Beta-Hcg Qualitative Serum Negative Negative   URINALYSIS,CULTURE IF INDICATED    Specimen: Urine   Result Value Ref Range    Color Yellow     Character Clear     Specific Gravity 1.037 <1.035    Ph 6.0 5.0 - 8.0    Glucose Negative Negative mg/dL    Ketones Trace (A) Negative mg/dL    Protein Negative Negative mg/dL    Bilirubin Negative Negative    Urobilinogen, Urine 1.0 Negative    Nitrite Negative Negative    Leukocyte Esterase Negative Negative    Occult Blood Negative Negative    Micro Urine  Req see below    CORRECTED CALCIUM   Result Value Ref Range    Correct Calcium 8.6 8.5 - 10.5 mg/dL   ESTIMATED GFR   Result Value Ref Range    GFR (CKD-EPI) 116 >60 mL/min/1.73 m 2         RADIOLOGY  I have independently interpreted the diagnostic imaging associated with this visit and am waiting the final reading from the radiologist.   My preliminary interpretation is as follows: Gallstones noted  Radiologist interpretation:   US-RUQ   Final Result         1.  Hepatomegaly   2.  Echogenic liver compatible with fatty change versus fibrosis.   3.  Cholelithiasis without additional sonographic findings of acute cholecystitis.            COURSE & MEDICAL DECISION MAKING    ED Observation Status? No; Patient does not meet criteria for ED Observation.     INITIAL ASSESSMENT, COURSE AND PLAN  Care Narrative:   DISPOSITION AND DISCUSSIONS  I have discussed management of the patient with the following physicians and KAI's: None    Discussion of management with other QHP or appropriate source(s): Pharmacy for medication verification      Escalation of care considered, and ultimately not performed:acute inpatient care management, however at this time, the patient is most appropriate for outpatient management    Barriers to care at this time, including but not limited to: Patient does not have established PCP.     Decision tools and prescription drugs considered including, but not limited to: Antibiotics not indicated .    37-year-old female presenting the [facility] with recurrent upper abdominal pain.  No gallstones.  High suspicion for biliary colic initially.  Work-up as above.  Redemonstration of cholelithiasis without cholecystitis.  Labs are also normal.  No white count and no elevated LFTs or lipase.  At this point I believe the patient has continued biliary colic.  Tonight likely after her greasy hamburger.  I will refer her back to GI and general surgery for outpatient care and follow-up.  She is  understanding of diet to follow and return precautions if needed.    FINAL DIAGNOSIS  1. Biliary colic           Electronically signed by: Thor Rojas M.D., 5/14/2023 8:10 PM

## 2024-05-18 ENCOUNTER — HOSPITAL ENCOUNTER (EMERGENCY)
Facility: MEDICAL CENTER | Age: 39
End: 2024-05-18
Attending: EMERGENCY MEDICINE

## 2024-05-18 ENCOUNTER — APPOINTMENT (OUTPATIENT)
Dept: RADIOLOGY | Facility: MEDICAL CENTER | Age: 39
End: 2024-05-18
Attending: EMERGENCY MEDICINE

## 2024-05-18 VITALS
WEIGHT: 215 LBS | OXYGEN SATURATION: 95 % | HEART RATE: 58 BPM | SYSTOLIC BLOOD PRESSURE: 100 MMHG | DIASTOLIC BLOOD PRESSURE: 60 MMHG | RESPIRATION RATE: 16 BRPM | HEIGHT: 62 IN | TEMPERATURE: 98.3 F | BODY MASS INDEX: 39.56 KG/M2

## 2024-05-18 DIAGNOSIS — R10.11 RIGHT UPPER QUADRANT ABDOMINAL PAIN: ICD-10-CM

## 2024-05-18 DIAGNOSIS — K80.20 CALCULUS OF GALLBLADDER WITHOUT CHOLECYSTITIS WITHOUT OBSTRUCTION: ICD-10-CM

## 2024-05-18 DIAGNOSIS — R11.2 NAUSEA AND VOMITING, UNSPECIFIED VOMITING TYPE: ICD-10-CM

## 2024-05-18 DIAGNOSIS — K80.50 BILIARY COLIC: ICD-10-CM

## 2024-05-18 LAB
ALBUMIN SERPL BCP-MCNC: 4 G/DL (ref 3.2–4.9)
ALBUMIN/GLOB SERPL: 1.2 G/DL
ALP SERPL-CCNC: 125 U/L (ref 30–99)
ALT SERPL-CCNC: 29 U/L (ref 2–50)
ANION GAP SERPL CALC-SCNC: 10 MMOL/L (ref 7–16)
AST SERPL-CCNC: 21 U/L (ref 12–45)
BASOPHILS # BLD AUTO: 0.2 % (ref 0–1.8)
BASOPHILS # BLD: 0.02 K/UL (ref 0–0.12)
BILIRUB SERPL-MCNC: <0.2 MG/DL (ref 0.1–1.5)
BUN SERPL-MCNC: 17 MG/DL (ref 8–22)
CALCIUM ALBUM COR SERPL-MCNC: 8.8 MG/DL (ref 8.5–10.5)
CALCIUM SERPL-MCNC: 8.8 MG/DL (ref 8.5–10.5)
CHLORIDE SERPL-SCNC: 107 MMOL/L (ref 96–112)
CO2 SERPL-SCNC: 22 MMOL/L (ref 20–33)
CREAT SERPL-MCNC: 0.68 MG/DL (ref 0.5–1.4)
EOSINOPHIL # BLD AUTO: 0.2 K/UL (ref 0–0.51)
EOSINOPHIL NFR BLD: 2.4 % (ref 0–6.9)
ERYTHROCYTE [DISTWIDTH] IN BLOOD BY AUTOMATED COUNT: 43.2 FL (ref 35.9–50)
GFR SERPLBLD CREATININE-BSD FMLA CKD-EPI: 113 ML/MIN/1.73 M 2
GLOBULIN SER CALC-MCNC: 3.3 G/DL (ref 1.9–3.5)
GLUCOSE SERPL-MCNC: 126 MG/DL (ref 65–99)
HCG SERPL QL: NEGATIVE
HCT VFR BLD AUTO: 39.4 % (ref 37–47)
HGB BLD-MCNC: 12.9 G/DL (ref 12–16)
IMM GRANULOCYTES # BLD AUTO: 0.03 K/UL (ref 0–0.11)
IMM GRANULOCYTES NFR BLD AUTO: 0.4 % (ref 0–0.9)
LIPASE SERPL-CCNC: 29 U/L (ref 11–82)
LYMPHOCYTES # BLD AUTO: 1.87 K/UL (ref 1–4.8)
LYMPHOCYTES NFR BLD: 22.8 % (ref 22–41)
MCH RBC QN AUTO: 28.4 PG (ref 27–33)
MCHC RBC AUTO-ENTMCNC: 32.7 G/DL (ref 32.2–35.5)
MCV RBC AUTO: 86.6 FL (ref 81.4–97.8)
MONOCYTES # BLD AUTO: 0.46 K/UL (ref 0–0.85)
MONOCYTES NFR BLD AUTO: 5.6 % (ref 0–13.4)
NEUTROPHILS # BLD AUTO: 5.63 K/UL (ref 1.82–7.42)
NEUTROPHILS NFR BLD: 68.6 % (ref 44–72)
NRBC # BLD AUTO: 0 K/UL
NRBC BLD-RTO: 0 /100 WBC (ref 0–0.2)
PLATELET # BLD AUTO: 326 K/UL (ref 164–446)
PMV BLD AUTO: 10.9 FL (ref 9–12.9)
POTASSIUM SERPL-SCNC: 4.2 MMOL/L (ref 3.6–5.5)
PROT SERPL-MCNC: 7.3 G/DL (ref 6–8.2)
RBC # BLD AUTO: 4.55 M/UL (ref 4.2–5.4)
SODIUM SERPL-SCNC: 139 MMOL/L (ref 135–145)
WBC # BLD AUTO: 8.2 K/UL (ref 4.8–10.8)

## 2024-05-18 PROCEDURE — 80053 COMPREHEN METABOLIC PANEL: CPT

## 2024-05-18 PROCEDURE — 84703 CHORIONIC GONADOTROPIN ASSAY: CPT

## 2024-05-18 PROCEDURE — 700111 HCHG RX REV CODE 636 W/ 250 OVERRIDE (IP): Performed by: EMERGENCY MEDICINE

## 2024-05-18 PROCEDURE — 85025 COMPLETE CBC W/AUTO DIFF WBC: CPT

## 2024-05-18 PROCEDURE — 83690 ASSAY OF LIPASE: CPT

## 2024-05-18 PROCEDURE — 96374 THER/PROPH/DIAG INJ IV PUSH: CPT

## 2024-05-18 PROCEDURE — 99284 EMERGENCY DEPT VISIT MOD MDM: CPT

## 2024-05-18 PROCEDURE — 76705 ECHO EXAM OF ABDOMEN: CPT

## 2024-05-18 PROCEDURE — 96375 TX/PRO/DX INJ NEW DRUG ADDON: CPT

## 2024-05-18 PROCEDURE — 36415 COLL VENOUS BLD VENIPUNCTURE: CPT

## 2024-05-18 RX ORDER — HYDROMORPHONE HYDROCHLORIDE 1 MG/ML
0.5 INJECTION, SOLUTION INTRAMUSCULAR; INTRAVENOUS; SUBCUTANEOUS ONCE
Status: COMPLETED | OUTPATIENT
Start: 2024-05-18 | End: 2024-05-18

## 2024-05-18 RX ORDER — ONDANSETRON 4 MG/1
4 TABLET, ORALLY DISINTEGRATING ORAL EVERY 8 HOURS PRN
Qty: 10 TABLET | Refills: 0 | Status: SHIPPED | OUTPATIENT
Start: 2024-05-18

## 2024-05-18 RX ORDER — ONDANSETRON 2 MG/ML
4 INJECTION INTRAMUSCULAR; INTRAVENOUS ONCE
Status: COMPLETED | OUTPATIENT
Start: 2024-05-18 | End: 2024-05-18

## 2024-05-18 RX ADMIN — ONDANSETRON 4 MG: 2 INJECTION INTRAMUSCULAR; INTRAVENOUS at 06:45

## 2024-05-18 RX ADMIN — HYDROMORPHONE HYDROCHLORIDE 0.5 MG: 1 INJECTION, SOLUTION INTRAMUSCULAR; INTRAVENOUS; SUBCUTANEOUS at 06:33

## 2024-05-18 ASSESSMENT — PAIN DESCRIPTION - PAIN TYPE: TYPE: ACUTE PAIN

## 2024-05-18 ASSESSMENT — FIBROSIS 4 INDEX: FIB4 SCORE: 0.43

## 2024-05-18 NOTE — ED NOTES
Patient discharged home per ERP.  Discharge teaching and education discussed with patient.     PIV removed.     VSS. Patient alert and oriented. Patient arranged ride for self. Able to ambulate off unit safely with steady gait.

## 2024-05-18 NOTE — ED PROVIDER NOTES
ER Provider Note    Scribed for Abdi Contreras M.D. by Lottie Bailey. 5/18/2024   6:04 AM    Primary Care Provider: None noted    CHIEF COMPLAINT  Chief Complaint   Patient presents with    Abdominal Pain     Pt c/o RUQ abdominal pain that began after midnight. (+) N/V. Hx of gallstones. Denies dysuria.      EXTERNAL RECORDS REVIEWED  The patient was last seen on 5/14/24 for a biliary colic.     HPI/ROS    OUTSIDE HISTORIAN(S):      Kiet Pollack is a 38 y.o. female who presents to the ED for evaluation of right upper quadrant onset midnight last night.  reports associated nausea, vomiting, shortness of breath and dizziness. She denies any dysuria. The patient has a history of gallstones.     PAST MEDICAL HISTORY  Past Medical History:   Diagnosis Date    Pyelonephritis complicating pregnancy 2000       SURGICAL HISTORY  History reviewed. No pertinent surgical history.    FAMILY HISTORY  Family History   Problem Relation Age of Onset    Diabetes Father     No Known Problems Mother     Cancer Maternal Grandfather         lung    Cancer Paternal Grandmother         bones    No Known Problems Sister     No Known Problems Brother        SOCIAL HISTORY   reports that she has never smoked. She has never used smokeless tobacco. She reports that she does not drink alcohol and does not use drugs.    CURRENT MEDICATIONS  Previous Medications    AMOXICILLIN (AMOXIL) 500 MG CAP    Take 1 Cap by mouth 3 times a day.    DOCUSATE SODIUM 100 MG CAP    Take 100 mg by mouth 2 times a day as needed for Constipation.    FERROUS SULFATE 325 (65 FE) MG EC TABLET    Take 1 Tab by mouth 3 times a day, with meals.    IBUPROFEN (MOTRIN) 600 MG TAB    Take 1 Tab by mouth every 6 hours as needed (For cramping after delivery; do not give if patient is receiving ketorolac (Toradol)).    PRENATAL MV-MIN-FE FUM-FA-DHA (PRENATAL 1 PO)    Take  by mouth.       ALLERGIES  None noted     PHYSICAL EXAM  /72   Pulse 61   Temp  "36.6 °C (97.9 °F) (Temporal)   Resp 20   Ht 1.575 m (5' 2\")   Wt 97.5 kg (215 lb)   SpO2 99%   BMI 39.32 kg/m²    Nursing note and vitals reviewed.  Constitutional: Well-developed and well-nourished. Moderate distress secondary to pain.   HENT: Head is normocephalic and atraumatic. Oropharynx is clear and moist without exudate or erythema.   Eyes: Pupils are equal, round, and reactive to light. Conjunctiva are normal.   Cardiovascular: Normal rate and regular rhythm. No murmur heard. Normal radial pulses.  Pulmonary/Chest: Breath sounds normal. No wheezes or rales.   Abdominal: Soft and right upper quadrant tenderness. No distention    Musculoskeletal: Extremities exhibit normal range of motion without edema or tenderness.   Neurological: Awake, alert and oriented to person, place, and time. No focal deficits noted.  Skin: Skin is warm and dry. No rash.   Psychiatric: Normal mood and affect. Appropriate for clinical situation    DIAGNOSTIC STUDIES    Labs:   Results for orders placed or performed during the hospital encounter of 05/18/24   CBC WITH DIFFERENTIAL   Result Value Ref Range    WBC 8.2 4.8 - 10.8 K/uL    RBC 4.55 4.20 - 5.40 M/uL    Hemoglobin 12.9 12.0 - 16.0 g/dL    Hematocrit 39.4 37.0 - 47.0 %    MCV 86.6 81.4 - 97.8 fL    MCH 28.4 27.0 - 33.0 pg    MCHC 32.7 32.2 - 35.5 g/dL    RDW 43.2 35.9 - 50.0 fL    Platelet Count 326 164 - 446 K/uL    MPV 10.9 9.0 - 12.9 fL    Neutrophils-Polys 68.60 44.00 - 72.00 %    Lymphocytes 22.80 22.00 - 41.00 %    Monocytes 5.60 0.00 - 13.40 %    Eosinophils 2.40 0.00 - 6.90 %    Basophils 0.20 0.00 - 1.80 %    Immature Granulocytes 0.40 0.00 - 0.90 %    Nucleated RBC 0.00 0.00 - 0.20 /100 WBC    Neutrophils (Absolute) 5.63 1.82 - 7.42 K/uL    Lymphs (Absolute) 1.87 1.00 - 4.80 K/uL    Monos (Absolute) 0.46 0.00 - 0.85 K/uL    Eos (Absolute) 0.20 0.00 - 0.51 K/uL    Baso (Absolute) 0.02 0.00 - 0.12 K/uL    Immature Granulocytes (abs) 0.03 0.00 - 0.11 K/uL    NRBC " (Absolute) 0.00 K/uL   COMP METABOLIC PANEL   Result Value Ref Range    Sodium 139 135 - 145 mmol/L    Potassium 4.2 3.6 - 5.5 mmol/L    Chloride 107 96 - 112 mmol/L    Co2 22 20 - 33 mmol/L    Anion Gap 10.0 7.0 - 16.0    Glucose 126 (H) 65 - 99 mg/dL    Bun 17 8 - 22 mg/dL    Creatinine 0.68 0.50 - 1.40 mg/dL    Calcium 8.8 8.5 - 10.5 mg/dL    Correct Calcium 8.8 8.5 - 10.5 mg/dL    AST(SGOT) 21 12 - 45 U/L    ALT(SGPT) 29 2 - 50 U/L    Alkaline Phosphatase 125 (H) 30 - 99 U/L    Total Bilirubin <0.2 0.1 - 1.5 mg/dL    Albumin 4.0 3.2 - 4.9 g/dL    Total Protein 7.3 6.0 - 8.2 g/dL    Globulin 3.3 1.9 - 3.5 g/dL    A-G Ratio 1.2 g/dL   LIPASE   Result Value Ref Range    Lipase 29 11 - 82 U/L   HCG QUAL SERUM   Result Value Ref Range    Beta-Hcg Qualitative Serum Negative Negative   ESTIMATED GFR   Result Value Ref Range    GFR (CKD-EPI) 113 >60 mL/min/1.73 m 2     Radiology:   This attending emergency physician has independently interpreted the diagnostic imaging associated with this visit and is awaiting the final reading from the radiologist.   Preliminary interpretation is a follows: Cholelithiasis    Radiologist interpretation:   US-RUQ   Final Result      1.  Cholelithiasis      2.  Minimal gallbladder wall thickening, nonspecific although could indicate cholecystitis in the appropriate clinical setting      3.  No biliary dilation      4.  Hepatomegaly           INITIAL ASSESSMENT AND PLAN    6:04 AM - Patient was evaluated at bedside for right upper quadrant pain. Ordered for US-RUQ, CBC with diff, CMP, Lipase, HCG Qual Serum and UA Culture to evaluate. The patient will be medicated with Zofran 4 mg and Dilaudid 0.5 mg for her symptoms. Patient verbalizes understanding and support with my plan of care.  Differential diagnoses include but not limited to: chololithiasis, colicystitis, viral syndrome, pancreatitis, peptic ulcer disease.     COURSE AND MEDICAL DECISION MAKING    7:38 AM - Elevated Alkaline  Phosphatase, lipase is normal, white blood cell count is normal. Ultrasound shows chololithiasis with gallbladder wall thickening.    7:45 AM - her pain has resolved she is feeling better.  Tolerating orals.  Feel she is appropriate for outpatient referral for surgery      The patient was treated with Zofran for nausea.  Placed on a cardiac monitor to monitor for any arrhythmia associated with Zofran and QT prolongation.    DISPOSITION AND DISCUSSIONS  Escalation of care considered, and ultimately not performed: acute inpatient care management, however at this time, the patient is most appropriate for outpatient management.    Barriers to care at this time, including but not limited to: Patient does not have established PCP.     Decision tools and prescription drugs considered including, but not limited to: Antibiotics: I considered prescribing antibiotics, however I have not identified a bacterial source of infection.    DISPOSITION:  Patient will be discharged home in stable condition.    FOLLOW UP:  [facility], Emergency Dept  [address] [place] [id number] [phone]    If symptoms worsen    FINAL DIAGNOSIS  1. Right upper quadrant abdominal pain    2. Biliary colic    3. Calculus of gallbladder without cholecystitis without obstruction         Lottie CHRISTIE (Scribe), am scribing for, and in the presence of, Abdi Contreras M.D..    Electronically signed by: Lottie Bailey (Floyd), 5/18/2024    Abdi CHRISTIE M.D. personally performed the services described in this documentation, as scribed by Lottie Bailey in my presence, and it is both accurate and complete.      The note accurately reflects work and decisions made by me.  Abdi Contreras M.D.  5/18/2024  1:18 PM

## 2024-05-18 NOTE — ED TRIAGE NOTES
"Chief Complaint   Patient presents with    Abdominal Pain     Pt c/o RUQ abdominal pain that began after midnight. (+) N/V. Hx of gallstones. Denies dysuria.        Pt ambulatory to triage. Pt A&Ox4, room air.     Pt to phlebotomy . Pt educated on alerting staff in changes to condition. Pt verbalized understanding. Protocol ordered.     /72   Pulse 61   Temp 36.6 °C (97.9 °F) (Temporal)   Resp 20   Ht 1.575 m (5' 2\")   Wt 97.5 kg (215 lb)   SpO2 99%   BMI 39.32 kg/m²     "

## 2024-05-18 NOTE — ED NOTES
Pt able to ambulate to [address] from the lobby with steady gait. Pt changed into gown and chart up for ERP.

## 2024-06-04 ENCOUNTER — HOSPITAL ENCOUNTER (OUTPATIENT)
Facility: MEDICAL CENTER | Age: 39
End: 2024-06-04
Attending: SURGERY | Admitting: SURGERY
Payer: COMMERCIAL

## 2024-06-05 ENCOUNTER — HOSPITAL ENCOUNTER (EMERGENCY)
Facility: MEDICAL CENTER | Age: 39
End: 2024-06-05
Attending: EMERGENCY MEDICINE
Payer: COMMERCIAL

## 2024-06-05 ENCOUNTER — ANESTHESIA EVENT (OUTPATIENT)
Dept: SURGERY | Facility: MEDICAL CENTER | Age: 39
End: 2024-06-05
Payer: COMMERCIAL

## 2024-06-05 ENCOUNTER — ANESTHESIA (OUTPATIENT)
Dept: SURGERY | Facility: MEDICAL CENTER | Age: 39
End: 2024-06-05
Payer: COMMERCIAL

## 2024-06-05 ENCOUNTER — APPOINTMENT (OUTPATIENT)
Dept: RADIOLOGY | Facility: MEDICAL CENTER | Age: 39
End: 2024-06-05
Attending: EMERGENCY MEDICINE
Payer: COMMERCIAL

## 2024-06-05 ENCOUNTER — PHARMACY VISIT (OUTPATIENT)
Dept: PHARMACY | Facility: MEDICAL CENTER | Age: 39
End: 2024-06-05
Payer: MEDICARE

## 2024-06-05 VITALS
BODY MASS INDEX: 39.56 KG/M2 | HEART RATE: 65 BPM | TEMPERATURE: 97 F | RESPIRATION RATE: 18 BRPM | DIASTOLIC BLOOD PRESSURE: 63 MMHG | HEIGHT: 62 IN | WEIGHT: 215 LBS | SYSTOLIC BLOOD PRESSURE: 105 MMHG | OXYGEN SATURATION: 96 %

## 2024-06-05 DIAGNOSIS — R11.2 NAUSEA AND VOMITING, UNSPECIFIED VOMITING TYPE: ICD-10-CM

## 2024-06-05 DIAGNOSIS — K80.50 BILIARY COLIC: ICD-10-CM

## 2024-06-05 DIAGNOSIS — G89.18 ACUTE POSTOPERATIVE PAIN: ICD-10-CM

## 2024-06-05 PROBLEM — K80.00 CHOLECYSTITIS, ACUTE WITH CHOLELITHIASIS: Status: ACTIVE | Noted: 2024-06-05

## 2024-06-05 LAB
ALBUMIN SERPL BCP-MCNC: 4 G/DL (ref 3.2–4.9)
ALBUMIN/GLOB SERPL: 1.3 G/DL
ALP SERPL-CCNC: 114 U/L (ref 30–99)
ALT SERPL-CCNC: 20 U/L (ref 2–50)
ANION GAP SERPL CALC-SCNC: 12 MMOL/L (ref 7–16)
APPEARANCE UR: CLEAR
AST SERPL-CCNC: 16 U/L (ref 12–45)
BASOPHILS # BLD AUTO: 0.2 % (ref 0–1.8)
BASOPHILS # BLD: 0.02 K/UL (ref 0–0.12)
BILIRUB SERPL-MCNC: 0.2 MG/DL (ref 0.1–1.5)
BILIRUB UR QL STRIP.AUTO: NEGATIVE
BUN SERPL-MCNC: 15 MG/DL (ref 8–22)
CALCIUM ALBUM COR SERPL-MCNC: 8.5 MG/DL (ref 8.5–10.5)
CALCIUM SERPL-MCNC: 8.5 MG/DL (ref 8.5–10.5)
CHLORIDE SERPL-SCNC: 108 MMOL/L (ref 96–112)
CO2 SERPL-SCNC: 19 MMOL/L (ref 20–33)
COLOR UR: YELLOW
CREAT SERPL-MCNC: 0.51 MG/DL (ref 0.5–1.4)
EOSINOPHIL # BLD AUTO: 0.13 K/UL (ref 0–0.51)
EOSINOPHIL NFR BLD: 1.3 % (ref 0–6.9)
ERYTHROCYTE [DISTWIDTH] IN BLOOD BY AUTOMATED COUNT: 44 FL (ref 35.9–50)
GFR SERPLBLD CREATININE-BSD FMLA CKD-EPI: 121 ML/MIN/1.73 M 2
GLOBULIN SER CALC-MCNC: 3.1 G/DL (ref 1.9–3.5)
GLUCOSE SERPL-MCNC: 109 MG/DL (ref 65–99)
GLUCOSE UR STRIP.AUTO-MCNC: NEGATIVE MG/DL
HCG SERPL QL: NEGATIVE
HCT VFR BLD AUTO: 37.4 % (ref 37–47)
HGB BLD-MCNC: 12.6 G/DL (ref 12–16)
IMM GRANULOCYTES # BLD AUTO: 0.04 K/UL (ref 0–0.11)
IMM GRANULOCYTES NFR BLD AUTO: 0.4 % (ref 0–0.9)
KETONES UR STRIP.AUTO-MCNC: NEGATIVE MG/DL
LEUKOCYTE ESTERASE UR QL STRIP.AUTO: NEGATIVE
LIPASE SERPL-CCNC: 28 U/L (ref 11–82)
LYMPHOCYTES # BLD AUTO: 1.35 K/UL (ref 1–4.8)
LYMPHOCYTES NFR BLD: 13.1 % (ref 22–41)
MCH RBC QN AUTO: 29 PG (ref 27–33)
MCHC RBC AUTO-ENTMCNC: 33.7 G/DL (ref 32.2–35.5)
MCV RBC AUTO: 86 FL (ref 81.4–97.8)
MICRO URNS: NORMAL
MONOCYTES # BLD AUTO: 0.59 K/UL (ref 0–0.85)
MONOCYTES NFR BLD AUTO: 5.7 % (ref 0–13.4)
NEUTROPHILS # BLD AUTO: 8.16 K/UL (ref 1.82–7.42)
NEUTROPHILS NFR BLD: 79.3 % (ref 44–72)
NITRITE UR QL STRIP.AUTO: NEGATIVE
NRBC # BLD AUTO: 0 K/UL
NRBC BLD-RTO: 0 /100 WBC (ref 0–0.2)
PATHOLOGY CONSULT NOTE: NORMAL
PH UR STRIP.AUTO: 7 [PH] (ref 5–8)
PLATELET # BLD AUTO: 278 K/UL (ref 164–446)
PMV BLD AUTO: 11.2 FL (ref 9–12.9)
POTASSIUM SERPL-SCNC: 3.9 MMOL/L (ref 3.6–5.5)
PROT SERPL-MCNC: 7.1 G/DL (ref 6–8.2)
PROT UR QL STRIP: NEGATIVE MG/DL
RBC # BLD AUTO: 4.35 M/UL (ref 4.2–5.4)
RBC UR QL AUTO: NEGATIVE
SODIUM SERPL-SCNC: 139 MMOL/L (ref 135–145)
SP GR UR STRIP.AUTO: 1.02
UROBILINOGEN UR STRIP.AUTO-MCNC: 0.2 MG/DL
WBC # BLD AUTO: 10.3 K/UL (ref 4.8–10.8)

## 2024-06-05 PROCEDURE — 700105 HCHG RX REV CODE 258: Performed by: ANESTHESIOLOGY

## 2024-06-05 PROCEDURE — 88304 TISSUE EXAM BY PATHOLOGIST: CPT

## 2024-06-05 PROCEDURE — 36415 COLL VENOUS BLD VENIPUNCTURE: CPT

## 2024-06-05 PROCEDURE — 160048 HCHG OR STATISTICAL LEVEL 1-5: Performed by: SURGERY

## 2024-06-05 PROCEDURE — 700101 HCHG RX REV CODE 250: Performed by: ANESTHESIOLOGY

## 2024-06-05 PROCEDURE — 700101 HCHG RX REV CODE 250: Performed by: SURGERY

## 2024-06-05 PROCEDURE — 99291 CRITICAL CARE FIRST HOUR: CPT

## 2024-06-05 PROCEDURE — 96375 TX/PRO/DX INJ NEW DRUG ADDON: CPT

## 2024-06-05 PROCEDURE — 99285 EMERGENCY DEPT VISIT HI MDM: CPT | Mod: 57 | Performed by: SURGERY

## 2024-06-05 PROCEDURE — 700111 HCHG RX REV CODE 636 W/ 250 OVERRIDE (IP): Mod: JZ | Performed by: ANESTHESIOLOGY

## 2024-06-05 PROCEDURE — 700111 HCHG RX REV CODE 636 W/ 250 OVERRIDE (IP): Mod: JZ | Performed by: EMERGENCY MEDICINE

## 2024-06-05 PROCEDURE — 83690 ASSAY OF LIPASE: CPT

## 2024-06-05 PROCEDURE — 160036 HCHG PACU - EA ADDL 30 MINS PHASE I: Performed by: SURGERY

## 2024-06-05 PROCEDURE — 160046 HCHG PACU - 1ST 60 MINS PHASE II: Performed by: SURGERY

## 2024-06-05 PROCEDURE — 160035 HCHG PACU - 1ST 60 MINS PHASE I: Performed by: SURGERY

## 2024-06-05 PROCEDURE — 160009 HCHG ANES TIME/MIN: Performed by: SURGERY

## 2024-06-05 PROCEDURE — 700105 HCHG RX REV CODE 258: Performed by: EMERGENCY MEDICINE

## 2024-06-05 PROCEDURE — 160002 HCHG RECOVERY MINUTES (STAT): Performed by: SURGERY

## 2024-06-05 PROCEDURE — 84703 CHORIONIC GONADOTROPIN ASSAY: CPT

## 2024-06-05 PROCEDURE — 96374 THER/PROPH/DIAG INJ IV PUSH: CPT

## 2024-06-05 PROCEDURE — 76705 ECHO EXAM OF ABDOMEN: CPT

## 2024-06-05 PROCEDURE — 85025 COMPLETE CBC W/AUTO DIFF WBC: CPT

## 2024-06-05 PROCEDURE — 700111 HCHG RX REV CODE 636 W/ 250 OVERRIDE (IP): Performed by: ANESTHESIOLOGY

## 2024-06-05 PROCEDURE — 80053 COMPREHEN METABOLIC PANEL: CPT

## 2024-06-05 PROCEDURE — 160025 RECOVERY II MINUTES (STATS): Performed by: SURGERY

## 2024-06-05 PROCEDURE — 81003 URINALYSIS AUTO W/O SCOPE: CPT

## 2024-06-05 PROCEDURE — 700102 HCHG RX REV CODE 250 W/ 637 OVERRIDE(OP): Performed by: ANESTHESIOLOGY

## 2024-06-05 PROCEDURE — 47562 LAPAROSCOPIC CHOLECYSTECTOMY: CPT | Mod: AS | Performed by: PHYSICIAN ASSISTANT

## 2024-06-05 PROCEDURE — 160047 HCHG PACU  - EA ADDL 30 MINS PHASE II: Performed by: SURGERY

## 2024-06-05 PROCEDURE — 160029 HCHG SURGERY MINUTES - 1ST 30 MINS LEVEL 4: Performed by: SURGERY

## 2024-06-05 PROCEDURE — A9270 NON-COVERED ITEM OR SERVICE: HCPCS | Performed by: ANESTHESIOLOGY

## 2024-06-05 PROCEDURE — 160041 HCHG SURGERY MINUTES - EA ADDL 1 MIN LEVEL 4: Performed by: SURGERY

## 2024-06-05 PROCEDURE — 47562 LAPAROSCOPIC CHOLECYSTECTOMY: CPT | Performed by: SURGERY

## 2024-06-05 PROCEDURE — RXMED WILLOW AMBULATORY MEDICATION CHARGE: Performed by: PHYSICIAN ASSISTANT

## 2024-06-05 RX ORDER — HYDROMORPHONE HYDROCHLORIDE 1 MG/ML
0.2 INJECTION, SOLUTION INTRAMUSCULAR; INTRAVENOUS; SUBCUTANEOUS
Status: DISCONTINUED | OUTPATIENT
Start: 2024-06-05 | End: 2024-06-05 | Stop reason: HOSPADM

## 2024-06-05 RX ORDER — HYDROMORPHONE HYDROCHLORIDE 1 MG/ML
0.4 INJECTION, SOLUTION INTRAMUSCULAR; INTRAVENOUS; SUBCUTANEOUS
Status: DISCONTINUED | OUTPATIENT
Start: 2024-06-05 | End: 2024-06-05 | Stop reason: HOSPADM

## 2024-06-05 RX ORDER — CEFAZOLIN SODIUM 1 G/3ML
INJECTION, POWDER, FOR SOLUTION INTRAMUSCULAR; INTRAVENOUS PRN
Status: DISCONTINUED | OUTPATIENT
Start: 2024-06-05 | End: 2024-06-05 | Stop reason: SURG

## 2024-06-05 RX ORDER — ROCURONIUM BROMIDE 10 MG/ML
INJECTION, SOLUTION INTRAVENOUS PRN
Status: DISCONTINUED | OUTPATIENT
Start: 2024-06-05 | End: 2024-06-05 | Stop reason: SURG

## 2024-06-05 RX ORDER — ONDANSETRON 4 MG/1
4 TABLET, ORALLY DISINTEGRATING ORAL EVERY 8 HOURS PRN
Qty: 5 TABLET | Refills: 0 | Status: ON HOLD | OUTPATIENT
Start: 2024-06-05 | End: 2024-06-09

## 2024-06-05 RX ORDER — DIPHENHYDRAMINE HYDROCHLORIDE 50 MG/ML
12.5 INJECTION INTRAMUSCULAR; INTRAVENOUS
Status: DISCONTINUED | OUTPATIENT
Start: 2024-06-05 | End: 2024-06-05 | Stop reason: HOSPADM

## 2024-06-05 RX ORDER — SODIUM CHLORIDE, SODIUM LACTATE, POTASSIUM CHLORIDE, CALCIUM CHLORIDE 600; 310; 30; 20 MG/100ML; MG/100ML; MG/100ML; MG/100ML
1000 INJECTION, SOLUTION INTRAVENOUS ONCE
Status: COMPLETED | OUTPATIENT
Start: 2024-06-05 | End: 2024-06-05

## 2024-06-05 RX ORDER — ONDANSETRON 2 MG/ML
4 INJECTION INTRAMUSCULAR; INTRAVENOUS ONCE
Status: COMPLETED | OUTPATIENT
Start: 2024-06-05 | End: 2024-06-05

## 2024-06-05 RX ORDER — OXYCODONE HCL 5 MG/5 ML
10 SOLUTION, ORAL ORAL
Status: COMPLETED | OUTPATIENT
Start: 2024-06-05 | End: 2024-06-05

## 2024-06-05 RX ORDER — SODIUM CHLORIDE, SODIUM LACTATE, POTASSIUM CHLORIDE, CALCIUM CHLORIDE 600; 310; 30; 20 MG/100ML; MG/100ML; MG/100ML; MG/100ML
INJECTION, SOLUTION INTRAVENOUS CONTINUOUS
Status: DISCONTINUED | OUTPATIENT
Start: 2024-06-05 | End: 2024-06-05 | Stop reason: HOSPADM

## 2024-06-05 RX ORDER — ONDANSETRON 2 MG/ML
INJECTION INTRAMUSCULAR; INTRAVENOUS PRN
Status: DISCONTINUED | OUTPATIENT
Start: 2024-06-05 | End: 2024-06-05 | Stop reason: SURG

## 2024-06-05 RX ORDER — BUPIVACAINE HYDROCHLORIDE AND EPINEPHRINE 5; 5 MG/ML; UG/ML
INJECTION, SOLUTION EPIDURAL; INTRACAUDAL; PERINEURAL
Status: DISCONTINUED | OUTPATIENT
Start: 2024-06-05 | End: 2024-06-05 | Stop reason: HOSPADM

## 2024-06-05 RX ORDER — HYDROMORPHONE HYDROCHLORIDE 1 MG/ML
0.1 INJECTION, SOLUTION INTRAMUSCULAR; INTRAVENOUS; SUBCUTANEOUS
Status: DISCONTINUED | OUTPATIENT
Start: 2024-06-05 | End: 2024-06-05 | Stop reason: HOSPADM

## 2024-06-05 RX ORDER — ACETAMINOPHEN 10 MG/ML
1000 INJECTION, SOLUTION INTRAVENOUS ONCE
Status: DISCONTINUED | OUTPATIENT
Start: 2024-06-05 | End: 2024-06-05

## 2024-06-05 RX ORDER — OXYCODONE HCL 5 MG/5 ML
5 SOLUTION, ORAL ORAL
Status: COMPLETED | OUTPATIENT
Start: 2024-06-05 | End: 2024-06-05

## 2024-06-05 RX ORDER — DEXAMETHASONE SODIUM PHOSPHATE 4 MG/ML
INJECTION, SOLUTION INTRA-ARTICULAR; INTRALESIONAL; INTRAMUSCULAR; INTRAVENOUS; SOFT TISSUE PRN
Status: DISCONTINUED | OUTPATIENT
Start: 2024-06-05 | End: 2024-06-05 | Stop reason: SURG

## 2024-06-05 RX ORDER — ONDANSETRON 2 MG/ML
4 INJECTION INTRAMUSCULAR; INTRAVENOUS
Status: COMPLETED | OUTPATIENT
Start: 2024-06-05 | End: 2024-06-05

## 2024-06-05 RX ORDER — PHENYLEPHRINE HCL IN 0.9% NACL 1 MG/10 ML
SYRINGE (ML) INTRAVENOUS PRN
Status: DISCONTINUED | OUTPATIENT
Start: 2024-06-05 | End: 2024-06-05 | Stop reason: SURG

## 2024-06-05 RX ORDER — OXYCODONE HYDROCHLORIDE 5 MG/1
5 TABLET ORAL EVERY 8 HOURS PRN
Qty: 15 TABLET | Refills: 0 | Status: ON HOLD | OUTPATIENT
Start: 2024-06-05 | End: 2024-06-09

## 2024-06-05 RX ORDER — ACETAMINOPHEN 500 MG
1000 TABLET ORAL
COMMUNITY

## 2024-06-05 RX ORDER — KETOROLAC TROMETHAMINE 15 MG/ML
15 INJECTION, SOLUTION INTRAMUSCULAR; INTRAVENOUS ONCE
Status: COMPLETED | OUTPATIENT
Start: 2024-06-05 | End: 2024-06-05

## 2024-06-05 RX ADMIN — ONDANSETRON 8 MG: 2 INJECTION INTRAMUSCULAR; INTRAVENOUS at 14:42

## 2024-06-05 RX ADMIN — METHOCARBAMOL 1000 MG: 100 INJECTION, SOLUTION INTRAMUSCULAR; INTRAVENOUS at 15:39

## 2024-06-05 RX ADMIN — FENTANYL CITRATE 50 MCG: 50 INJECTION, SOLUTION INTRAMUSCULAR; INTRAVENOUS at 15:07

## 2024-06-05 RX ADMIN — DEXAMETHASONE SODIUM PHOSPHATE 8 MG: 4 INJECTION INTRA-ARTICULAR; INTRALESIONAL; INTRAMUSCULAR; INTRAVENOUS; SOFT TISSUE at 14:18

## 2024-06-05 RX ADMIN — FENTANYL CITRATE 50 MCG: 50 INJECTION, SOLUTION INTRAMUSCULAR; INTRAVENOUS at 15:23

## 2024-06-05 RX ADMIN — ROCURONIUM BROMIDE 50 MG: 50 INJECTION, SOLUTION INTRAVENOUS at 14:14

## 2024-06-05 RX ADMIN — Medication 100 MCG: at 14:18

## 2024-06-05 RX ADMIN — FENTANYL CITRATE 50 MCG: 50 INJECTION, SOLUTION INTRAMUSCULAR; INTRAVENOUS at 16:12

## 2024-06-05 RX ADMIN — SUGAMMADEX 200 MG: 100 INJECTION, SOLUTION INTRAVENOUS at 14:42

## 2024-06-05 RX ADMIN — SODIUM CHLORIDE, POTASSIUM CHLORIDE, SODIUM LACTATE AND CALCIUM CHLORIDE 1000 ML: 600; 310; 30; 20 INJECTION, SOLUTION INTRAVENOUS at 06:08

## 2024-06-05 RX ADMIN — KETOROLAC TROMETHAMINE 15 MG: 15 INJECTION, SOLUTION INTRAMUSCULAR; INTRAVENOUS at 15:27

## 2024-06-05 RX ADMIN — CEFAZOLIN 2 G: 1 INJECTION, POWDER, FOR SOLUTION INTRAMUSCULAR; INTRAVENOUS at 14:17

## 2024-06-05 RX ADMIN — PROPOFOL 200 MG: 10 INJECTION, EMULSION INTRAVENOUS at 14:13

## 2024-06-05 RX ADMIN — FENTANYL CITRATE 100 MCG: 50 INJECTION, SOLUTION INTRAMUSCULAR; INTRAVENOUS at 14:07

## 2024-06-05 RX ADMIN — FENTANYL CITRATE 50 MCG: 50 INJECTION, SOLUTION INTRAMUSCULAR; INTRAVENOUS at 14:39

## 2024-06-05 RX ADMIN — OXYCODONE HYDROCHLORIDE 10 MG: 5 SOLUTION ORAL at 15:07

## 2024-06-05 RX ADMIN — ONDANSETRON 4 MG: 2 INJECTION INTRAMUSCULAR; INTRAVENOUS at 06:10

## 2024-06-05 RX ADMIN — FENTANYL CITRATE 50 MCG: 50 INJECTION, SOLUTION INTRAMUSCULAR; INTRAVENOUS at 15:00

## 2024-06-05 RX ADMIN — FENTANYL CITRATE 100 MCG: 50 INJECTION, SOLUTION INTRAMUSCULAR; INTRAVENOUS at 06:10

## 2024-06-05 RX ADMIN — ONDANSETRON 4 MG: 2 INJECTION INTRAMUSCULAR; INTRAVENOUS at 17:15

## 2024-06-05 RX ADMIN — HYDROMORPHONE HYDROCHLORIDE 0.4 MG: 1 INJECTION, SOLUTION INTRAMUSCULAR; INTRAVENOUS; SUBCUTANEOUS at 16:42

## 2024-06-05 ASSESSMENT — PAIN DESCRIPTION - PAIN TYPE
TYPE: SURGICAL PAIN
TYPE: ACUTE PAIN
TYPE: SURGICAL PAIN
TYPE: ACUTE PAIN;SURGICAL PAIN
TYPE: SURGICAL PAIN
TYPE: ACUTE PAIN;SURGICAL PAIN
TYPE: SURGICAL PAIN

## 2024-06-05 ASSESSMENT — FIBROSIS 4 INDEX: FIB4 SCORE: 0.47

## 2024-06-05 ASSESSMENT — PAIN SCALES - GENERAL: PAIN_LEVEL: 0

## 2024-06-05 NOTE — ANESTHESIA TIME REPORT
Anesthesia Start and Stop Event Times       Date Time Event    6/5/2024 1349 Ready for Procedure     1406 Anesthesia Start     1501 Anesthesia Stop          Responsible Staff  06/05/24      Name Role Begin End    Doyle Griffin M.D. Anesth 1406 1501          Overtime Reason:  no overtime (within assigned shift)    Comments:

## 2024-06-05 NOTE — DISCHARGE INSTRUCTIONS
Instrucciones Para [facility] [place]  (Home Care Instructions)    ACTIVIDAD: Descanse y tome todo con mucha calma las primeras 24 horas después de galvan cirugía.  Dez persona adulta responsable debe permanecer con usted barbara dejuan periodo de tiempo.  Es normal sentirse sonoliento o sonolienta barbara esas primeras horas.  Le recomendamos que no shea nada que requiera equilibrio, ina decisiones a mucha coordinación de galvan parte.    NO SHEA ESTO PURANTE LAS PRIMERAS 24 HORAS:   Manejar o conducir algún vehiculo, operar maquinarias o utilizar electrodomesticos.   Beber cerveza o algún otro tipo de bebida alcohólica.   Ina decisiones importantes o firmar documentos legales.    INSTRUCCIONES ESPECIALES:Instrucciones de xi posoperatoria:    1. DIETA: Al recibir el xi del hospital, puede reanudar galvan dieta preoperatoria normal, a menos que se le indique específicamente lo contrario. Dependiendo de cómo te sientas y de si tienes náuseas o no, es posible que desees seguir dez dieta blanda barbara los primeros días. Sin embargo, puede avanzar en esto tan rápido osbaldo se sienta listo.    2. ACTIVIDADES: Después del xi del hospital, podrá reanudar todas fernando actividades de rutina; sin embargo, no debe levantar objetos pesados (más de 20 libras o dez bolsa de comestibles) ni realizar actividades extenuantes barbara al menos 2 semanas. La duración puede ser mayor, dependiendo de galvan procedimiento quirúrgico. Las actividades rutinarias de la amina diaria son aceptables. El nivel de actividad debe abordarse en galvan sera de seguimiento posoperatorio.    3. CONDUCIR: Puede conducir siempre que no esté tomando analgésicos y pueda realizar las actividades necesarias para conducir, es decir, girar, doblarse, torcerse, etc.    4. BAÑO: Puede mojar la herida en cualquier momento después de salir del hospital. Puede ducharse, nancy no sumergirse en la bañera barbara al menos dos semanas.   Si tiene apósitos para heridas, es posible que se desprendan  después de 48 horas.   Si tiene pegamento para piel en la herida, bushra se caerá solo, no lo toque.   Si tiene Steri-Strips en la herida, éstas se caerán solas; no las toque. Puede recortar los bordes si es necesario.    5. FUNCIÓN INTESTINAL:    Después de la cirugía, no es raro que los pacientes presenten deposiciones frecuentes o blandas después de las comidas. [place] generalmente se corrige solo después de unos días o algunas semanas. Si esto ocurre, no te preocupes; esto se resolverá por sí solo.   El estreñimiento es mucho más común que las heces blandas. La causa es la combinación de analgésicos y disminución del nivel de actividad y posiblemente la naturaleza del procedimiento quirúrgico realizado. Si hazel que esto está ocurriendo, puede utilizar un tratamiento de venta rayshawn osbaldo MiraLAX (o Leche de Magnesia, Ex-Lax, Senokot, etc.) hasta que el problema se haya resuelto. Heaven mucha agua y trate de dejar de chacho analgésicos narcóticos tan pronto osbaldo le resulte cómodo.    6. MEDICAMENTOS PARA EL DOLOR:    Se le dará dez receta para analgésicos al momento del xi. Tómelos según las indicaciones. Es importante recordar no chacho medicamentos con el estómago vacío ya que esto puede provocar náuseas.   También puede chacho acetaminofén y/o TEDDY (ibuprofeno, Aleve, Advil, Motrin) de venta rayshawn según las instrucciones del paquete.   También puede aplicar hielo en la herida para disminuir el dolor y la hinchazón. Puedes alternar 20 minutos con hielo y 20 minutos sin él barbara las primeras 24 a 48 horas. Asegúrese de colocar dez toallita o dez toalla entre la bolsa de hielo y galvan piel.   Tenga en cuenta que los analgésicos narcóticos no se pueden reabastecer a menos que sea atendido por un médico. Asegúrese de llamar al consultorio si se está quedando sin medicamento o si la dosis que le recetaron no le está funcionando nida.    7. LLAME A LA OFICINA QUIRJAMILAICA RAMIRO BLNACA [phone] SI TIENE:  (1) Fiebre de más de  101 °F, (2) Dolor inusual en el pecho o en las piernas, (3) Drenaje o líquido de la incisión que puede tener mal olor, mayor sensibilidad o dolor en la herida o los bordes de la herida ya no están juntos, enrojecimiento o hinchazón en el sitio de la incisión. No dude en llamar si tiene alguna otra pregunta.      DIETA: Para evitar las nauseas, prosiga despacito con galvan dieta a medida que pueda ir tolerándola mejor, evite comidas muy condimentadas o grasosas barbara dejuan primer día.  Vaya agregando comidas más substanciadas a galvan dieta a medida que asi lo indique galvan médica.   SIGA AGREGANDO LIQUIDOS Y COMIDAS CON FIBRA PARA EVITAR ESTREÑIMIENTO.      MEDICAMENTOS/MEDICINAS:  Vuelva a chacho fernando medicamentos diarios.  Irmo los medicamentos que se le prescribe con un poco de comida.  Si no le prescribe ningún tipo de medicamento, entonces puede chacho medicinas para el dolor que no contienen aspirina, si las necesita.  LAS MEDICINAS PARA EL DOLOR PUEDEN ESTREÑIRLE MUCHO.  Irmo un suavizante para el excremento o materia fecal (stool softener) o un laxativo osbaldo por ejemplo: senokot, pericolase, o leche de magnesia, si lo necesita.    La prescripción la administro oxycodone and zofran (nausea y dolor).  La ultima sosis de medicina para el dolor fue administrada 3 pm.     Se debe hacer dez consulta medica con el doctor, Líame para hacer la sera.    Usted debe LIAMAR A GALVAN MEDICO si tiene los siguientes síntomas:   -   Dez fiebre más xi de 101 grados Fahrenheit.   -   Un dolor incesante aún con los medicamentos, o nauseas y vómito persistente.   -   Un sangrado excesivo (xavier que traspasa los vendajes o gasas) o algúln tipo de drenaje inesperado que proviene de la henda.     -   Un color ni exagerado o hinchazón alrededor del área en donde se le hizo incisión o jv, o un drenaje de pus o con olor tang proveniente de la henda.   -    La inhabilidad de orinar o vaciar galvan vejiga en 8 horas.   -    Problemas con a  respiración o simeon en el pecho.    Usted debe llamar al 911 si se presentan problemas con el dolor al respirar o el pecho.  Si no se puede ponnoer en comunicación con un medica o con el centro de cirugía, usted debe ir a la estación de emergencia (emergency room) más cercana o a un centro de atención de urgencia (urgent care center).  El teléfono del medico es: [phone] Dr Orozco    LOS SÍNTOMAS DE UN LEVE RESFRIO SON MUY NORMALES.  ADEMÁS USTED PUEDE LLEGAR A SENTIR SIMEON GENERALES DE MÚSCULOS, IRRITACIÓN EN LA GARGANTA, SIMEON DE VINCENZO Y/O UN POCO DE NAUSEAS.    Sie tiene alguna pregunta, llame a galvan médico.  Si galvan médico no se encuentra disponible, por favor llame al Centro de Cirugía at [phone].  el Centro está abierto de Lunes a Viernes desde las 7:00 de la manana hasta las 5:00 de la noche.        Post Operative Discharge Instructions:    1. DIET: Upon discharge from the hospital, you may resume your normal preoperative diet, unless specifically directed otherwise. Depending on how you are feeling and whether you have nausea or not, you may wish to stay with a bland diet for the first few days. However, you can advance this as quickly as you feel ready.    2. ACTIVITIES: After discharge from the hospital, you may resume full routine activities; however, there should be no heavy lifting (greater than 20 pounds or a bag of groceries) and no strenuous activities for at least 2 weeks. The duration may be longer, depending on your surgical procedure. Routine activities of daily living are acceptable. Activity level should be addressed at your post-op follow up appointment.    3. DRIVING: You may drive whenever you are off pain medications and are able to perform the activities needed to drive, i.e., turning, bending, twisting, etc.    4. BATHING: You may get the wound wet at any time after leaving the hospital. You may shower, but do not submerge in a bath for at least two weeks.  If you have wound  dressings, they may come off after 48 hours.  If you have skin glue to the wound, this will fall off on its own, do not pick at it.  If you have Steri-Strips to the wound, these will fall off on their own, do not pick at them. You may trim the edges if needed.    5. BOWEL FUNCTION:   After surgery, it is not uncommon for patients to develop either frequent or loose stools after meals. This usually corrects itself after a few days, to a few weeks. If this occurs, do not worry; this will resolve on its own.  Constipation is much more common than loose stools. The cause is the combination of pain medication and decreased activity level and possibly the nature of the surgical procedure performed. If you feel this is occurring, you may use an over-the-counter treatment such as MiraLAX (or Milk of Magnesia, Ex-Lax, Senokot, etc.) until the problem has resolved. Drink plenty of water and try to wean off narcotic pain medications as soon as is comfortable for you.    6. PAIN MEDICATION:   You will be given a prescription for pain medication at discharge. Please take these as directed. It is important to remember not to take medications on an empty stomach as this may cause nausea.  You may also take over the counter acetaminophen and/or NSAIDS (ibuprofen, Aleve, Advil, Motrin) per the package instructions.  You may also use ice to the wound to decrease pain and swelling. You may alternate 20 minutes on and 20 minutes off with the ice for the first 24-48 hours. Make sure you place a washcloth or towel between the ice pack and your skin.  Please note that narcotic pain medication cannot be refilled unless you are seen by a doctor. Make sure you call the office if you are running low on medication or if the dose you have been prescribed is not working well for you.    7.CALL THE [facility] OFFICE AT [phone] IF YOU HAVE:  (1) Fevers to more than 101F, (2) Unusual chest or leg pain, (3) Drainage or fluid from  incision that may be foul smelling, increased tenderness or soreness at the wound or the wound edges are no longer together, redness or swelling at the incision site. Do not hesitate to call with any other questions.     HOME CARE INSTRUCTIONS    ACTIVITY: Rest and take it easy for the first 24 hours.  A responsible adult is recommended to remain with you during that time.  It is normal to feel sleepy.  We encourage you to not do anything that requires balance, judgment or coordination.    FOR 24 HOURS DO NOT:  Drive, operate machinery or run household appliances.  Drink beer or alcoholic beverages.  Make important decisions or sign legal documents.    DIET: To avoid nausea, slowly advance diet as tolerated, avoiding spicy or greasy foods for the first day.  Add more substantial food to your diet according to your physician's instructions.  Babies can be fed formula or breast milk as soon as they are hungry.  INCREASE FLUIDS AND FIBER TO AVOID CONSTIPATION.    MEDICATIONS: Resume taking daily medication.  Take prescribed pain medication with food.  If no medication is prescribed, you may take non-aspirin pain medication if needed.  PAIN MEDICATION CAN BE VERY CONSTIPATING.  Take a stool softener or laxative such as senokot, pericolace, or milk of magnesia if needed.    Prescription given for Roxicodone .    Last pain medication given Oxycodone 10mg at 3:07 pm.    A follow-up appointment should be arranged with your doctor in 1-2 weeks; call to schedule.    You should CALL YOUR PHYSICIAN if you develop:  Fever greater than 101 degrees F.  Pain not relieved by medication, or persistent nausea or vomiting.  Excessive bleeding (blood soaking through dressing) or unexpected drainage from the wound.  Extreme redness or swelling around the incision site, drainage of pus or foul smelling drainage.  Inability to urinate or empty your bladder within 8 hours.  Problems with breathing or chest pain.    You should call 911 if  you develop problems with breathing or chest pain.  If you are unable to contact your doctor or surgical center, you should go to the nearest emergency room or urgent care center.  Physician's telephone #: Dr Orozco [phone]     MILD FLU-LIKE SYMPTOMS ARE NORMAL.  YOU MAY EXPERIENCE GENERALIZED MUSCLE ACHES, THROAT IRRITATION, HEADACHE AND/OR SOME NAUSEA.    If any questions arise, call your doctor.  If your doctor is not available, please feel free to call the Surgical Center at [phone].  The Center is open Monday through Friday from 7AM to 7PM.      A registered nurse may call you a few days after your surgery to see how you are doing after your procedure.    You may also receive a survey in the mail within the next two weeks and we ask that you take a few moments to complete the survey and return it to us.  Our goal is to provide you with very good care and we value your comments.     Depression / Suicide Risk    As you are discharged from this [facility] facility, it is important to learn how to keep safe from harming yourself.    Recognize the warning signs:  Abrupt changes in personality, positive or negative- including increase in energy   Giving away possessions  Change in eating patterns- significant weight changes-  positive or negative  Change in sleeping patterns- unable to sleep or sleeping all the time   Unwillingness or inability to communicate  Depression  Unusual sadness, discouragement and loneliness  Talk of wanting to die  Neglect of personal appearance   Rebelliousness- reckless behavior  Withdrawal from people/activities they love  Confusion- inability to concentrate     If you or a loved one observes any of these behaviors or has concerns about self-harm, here's what you can do:  Talk about it- your feelings and reasons for harming yourself  Remove any means that you might use to hurt yourself (examples: pills, rope, extension cords, firearm)  Get professional help from the  community (Mental Health, Substance Abuse, psychological counseling)  Do not be alone:Call your Safe Contact- someone whom you trust who will be there for you.  Call your local CRISIS HOTLINE 124-2047 or 616-100-2176  Call your local Children's Mobile Crisis Response Team [place] (692) 154-8026 or www.[employer]  Call the toll free National Suicide Prevention Hotlines   National Suicide Prevention Lifeline 703-968-PEJW (0131)  [facility] Line Network 800-SUICIDE (707-1317)    I acknowledge receipt and understanding of these Home Care instructions.

## 2024-06-05 NOTE — OP REPORT
Preoperative diagnosis acute cholecystitis with cholelithiasis  Postoperative diagnosis same  Operation laparoscopic cholecystectomy  Surgeon;kevin  Assistant ARAVIND Arredondo  An assistant was required for the safe completion of the surgical case.  The assistant provided retraction, exposure, performed wound closure and assisted with instrumentation promoting the efficiency of the surgery performed.  I felt an assistant was necessary in the interest of safety and efficiency.  My request for assistance is based on my training and experience and should be patently obvious to the most casual observer as necessary.  Anesthesia Dr. Griffin  Wound classification clean contaminated  Specimens 1 gallbladder  Complications none noted  Blood loss minimal  Counts correct x 2  Operative note this 38-year-old female presents with somewhat consistent biliary colic over the last 2 weeks.  She was actually seen in surgical clinic yesterday and surgery scheduled elective basis towards the end of the month.  The patient however was in daily pain which seems her more severe this morning she presented back to the emergency room for evaluation she had evidence of gallbladder wall thickening certainly consistent with hydrops of early acute cholecystitis with cholelithiasis.  She was felt to be a candidate to proceed laparoscopic cholecystectomy the risk possible complications of operation were carefully explained to patient in detail.  She received detailed postoperative care instructions her  acted as an .  She had an opportunity to have all questions answered and agreed to proceed with surgery she did receive antibiotics sequential stockings were applied as antiembolism prophylaxis she was taken to the operating room with her consent placed under anesthesia with her consent once anesthetized her abdomen prepped with ChloraPrep solution and sterile drapes were applied a timeout was affected a solution of half  percent Marcaine with epinephrine was liberally infiltrated all incisions incision was made in the infraumbilical location the fascia was elevated Veress needle was inserted saline drop test was permissive to proceed with step pneumo insufflation once fully pneumo insufflated a 5 mm trocar was placed this facilitated video laparoscopy there is no evidence of injury related to entry patient had the gallbladder identified.  11 mm trocars placed in the high right epigastrium and 5 mm trocars placed in the mid right epigastrium.  The gallbladder was grasped and elevated.  The infundibulum was grasped and placed on traction the triangle of Monik was defined and dissected.  The cystic duct was fully mobilized as well as the cystic artery these were triply clipped and divided with Hem-o-osito clips.  The patient had the gallbladder then taken out using countertraction electrocautery.  The gallbladder fossa was made hemostatic progressively with electrocautery.  The gallbladder was freed from the gallbladder fossa.  There is no significant bile spillage or contamination.  Irrigation was not required the gallbladder was delivered via an Endosac through the epigastric incision.  The fascia was closed here using an 0 Vicryl suture with an Endo Close device.  The wounds were irrigated then closed the skin using running 4-0 Monocryl subcuticular and the wounds were sealed with Dermabond.  Patient was awakened extubated taken recovery room in stable satisfactory condition its conceivable the patient be treated on an ambulatory basis and discharge when she meets criteria.  She will be given instructions on postoperative multimodal analgesia which were also reiterated preoperatively.  This will include a prescription for narcotic analgesics she was asked to return to see us in the office in approximately 1 week sooner if there are problems in the interim.  Blood loss was minimal today counts were correct.

## 2024-06-05 NOTE — ED TRIAGE NOTES
"Chief Complaint   Patient presents with    Abdominal Pain     Pt has history of gallstones with surgery scheduled 6/21 for Cholecystectomy but was told to come to the ER if unable to manage pain. +N/V     Pt ambulatory to triage for above complaint    Placed in lobby. Educated on triage process.    A+Ox4, GCS 15    /74   Pulse 75   Temp 36.3 °C (97.3 °F) (Temporal)   Resp 14   Ht 1.575 m (5' 2\")   Wt 97.5 kg (215 lb)   SpO2 98%   BMI 39.32 kg/m²     "

## 2024-06-05 NOTE — ED NOTES
Med rec complete per family at bedside. Allergies reviewed.  No BC  No ABX   No anticoagulants  No Chemo  No dialysis  No infusions.  [phone]

## 2024-06-05 NOTE — H&P
Surgery General History & Physical Note    Date  6/5/2024    Primary Care Physician  Pcp Pt States None    CC  Intractable biliary colic    HPI  This is a 38 y.o. female who presented with intractable biliary colic.  Patient has known gallstones.  She has had recurrent episodes of biliary colic over the last 2 years over the last 2 weeks she has had daily pain.  She went to the emergency room was referred for surgical evaluation.  She had surgical evaluation yesterday and was scheduled for later this month however the patient's pain is quite incapacitating.  She was told if she needed to she could return to the emergency room which she did today.  She does have gallstones.  Laboratory work is normal but she is tender in the right upper quadrant.  It is felt like she might have a hydrops or other acute conditions and she is being taken now for laparoscopic cholecystectomy with her consent    Past Medical History:   Diagnosis Date    Pyelonephritis complicating pregnancy 2000       History reviewed. No pertinent surgical history.    No current facility-administered medications for this encounter.       Social History     Socioeconomic History    Marital status: Single     Spouse name: Not on file    Number of children: Not on file    Years of education: Not on file    Highest education level: Not on file   Occupational History    Not on file   Tobacco Use    Smoking status: Never    Smokeless tobacco: Never   Vaping Use    Vaping status: Never Used   Substance and Sexual Activity    Alcohol use: No    Drug use: No    Sexual activity: Yes     Partners: Male     Comment: None   Other Topics Concern    Not on file   Social History Narrative    Not on file     Social Determinants of Health     Financial Resource Strain: Not on file   Food Insecurity: Not on file   Transportation Needs: Not on file   Physical Activity: Not on file   Stress: Not on file   Social Connections: Not on file   Intimate Partner Violence: Not on  file   Housing Stability: Not on file       Family History   Problem Relation Age of Onset    Diabetes Father     No Known Problems Mother     Cancer Maternal Grandfather         lung    Cancer Paternal Grandmother         bones    No Known Problems Sister     No Known Problems Brother        Allergies  Patient has no known allergies.    Review of Systems  Negative except for Tam colic.  Patient has good exercise tolerance    Physical Exam  Pump  female of stated age who does appear to be uncomfortable in acute distress.  HEENT examination is unremarkable she is not icteric.  Neck is supple thyroid is normal lungs are clear card examination normal breasts were not examined abdomen is tender in the right upper quadrant deep palpation there is no peritoneal findings no abdominal wall hernias are noted.  Extremities are free of deformity peritoneum was not inspected neurologically she is intact.  Her skin is unremarkable  Vital Signs  Blood Pressure: 108/68   Temperature: 36.1 °C (96.9 °F)   Pulse: 66   Respiration: 16   Pulse Oximetry: (!) 66 %       Labs:  Recent Labs     06/05/24  0535   WBC 10.3   RBC 4.35   HEMOGLOBIN 12.6   HEMATOCRIT 37.4   MCV 86.0   MCH 29.0   MCHC 33.7   RDW 44.0   PLATELETCT 278   MPV 11.2     Recent Labs     06/05/24  0535   SODIUM 139   POTASSIUM 3.9   CHLORIDE 108   CO2 19*   GLUCOSE 109*   BUN 15   CREATININE 0.51   CALCIUM 8.5         Recent Labs     06/05/24  0535   ASTSGOT 16   ALTSGPT 20   TBILIRUBIN 0.2   ALKPHOSPHAT 114*   GLOBULIN 3.1       Radiology:  US-RUQ   Final Result   :   Cholelithiasis with stable nonspecific borderline gallbladder wall thickening. If concern for acute cholecystitis, nuclear medicine HIDA scan may be performed.      Hepatomegaly.            Assessment/Plan:  Patient has some nonspecific gallbladder wall thickening and white count is at the upper limit of normal.  She is tender.  She has been persistently symptomatic for 2 weeks.  She is clearly  a candidate to proceed with urgent surgical intervention.  The risks possible complications of operation were explained to the patient and her  in detail.  Her  acted as an .  They are willing to accept these risks and proceed to surgical intervention.  If the procedure was uncomplicated under ideal circumstances she might be dischargeable and ambulatory status later today.  Quire close clinical follow-up which would be arranged.

## 2024-06-05 NOTE — OR NURSING
Pt was very tearful/ anxious upon arrival.   Now calm/ dozing. Pain appears to be tolerable.   HR 62 /72.  93% room air. RR 14.

## 2024-06-05 NOTE — OR NURSING
WENDI puente updated. Surgery went well. Working on getting pt more comfortable. Waiting for doctor's orders. Will update on the plan.   Rosendo is in the waiting area.

## 2024-06-05 NOTE — ED NOTES
Patient ambulatory to the bathroom with steady gait. Urine sample obtain and sent to lab. Patient returned to room without issue. Patient endorses minimal pain at this time. States no further needs at this time.

## 2024-06-05 NOTE — ED PROVIDER NOTES
ED Provider Note    CHIEF COMPLAINT  Chief Complaint   Patient presents with    Abdominal Pain     Pt has history of gallstones with surgery scheduled 6/21 for Cholecystectomy but was told to come to the ER if unable to manage pain. +N/V       EXTERNAL RECORDS REVIEWED  External ED Note from [facility] in July 2022 with abdominal pain, noted to have gallstones, she was also seen here in the emergency department in 2023 for the same and on May 18 of this year for the same    HPI/ROS  LIMITATION TO HISTORY   Select: Language Bur[place]ian,  Used   OUTSIDE HISTORIAN(S):  Family      Kiet Pollack is a 38 y.o. female who presents with abdominal pain.  Patient reports she has been having intermittent abdominal pain for several months to year, has been worse over the last week and she was seen in the emergency department 2 weeks ago for similar.  She actually had a follow-up surgery appointment yesterday and essentially since that point has had constant pain uncontrolled with medications at home.  Last night she also began to feel nauseated and has had 2 episodes of nonbloody emesis.  No diarrhea, no fevers or chills.  No chest pain or shortness of breath.  No dysuria.    No prior surgical history    Reports that at her surgical appointment she was told to come to the ER if she had continued pain for surgery        PAST MEDICAL HISTORY   has a past medical history of Pyelonephritis complicating pregnancy (2000).    SURGICAL HISTORY  patient denies any surgical history    FAMILY HISTORY  Family History   Problem Relation Age of Onset    Diabetes Father     No Known Problems Mother     Cancer Maternal Grandfather         lung    Cancer Paternal Grandmother         bones    No Known Problems Sister     No Known Problems Brother        SOCIAL HISTORY  Social History     Tobacco Use    Smoking status: Never    Smokeless tobacco: Never   Vaping Use    Vaping status: Never Used   Substance and Sexual Activity     "Alcohol use: No    Drug use: No    Sexual activity: Yes     Partners: Male     Comment: None       CURRENT MEDICATIONS  Home Medications       Reviewed by Richard Lackey R.N. (Registered Nurse) on 06/05/24 at 0502  Med List Status: Partial     Medication Last Dose Status   amoxicillin (AMOXIL) 500 MG Cap  Active   docusate sodium 100 MG Cap  Active   ferrous sulfate 325 (65 Fe) MG EC tablet  Active   ibuprofen (MOTRIN) 600 MG Tab  Active   ondansetron (ZOFRAN ODT) 4 MG TABLET DISPERSIBLE  Active   Prenatal MV-Min-Fe Fum-FA-DHA (PRENATAL 1 PO)  Active                  Audit from Redirected Encounters    **Home medications have not yet been reviewed for this encounter**         ALLERGIES  No Known Allergies    PHYSICAL EXAM  VITAL SIGNS: /62   Pulse 60   Temp 36.3 °C (97.3 °F) (Temporal)   Resp 16   Ht 1.575 m (5' 2\")   Wt 97.5 kg (215 lb)   SpO2 97%   BMI 39.32 kg/m²      Pulse ox interpretation: I interpret this pulse ox as normal.  Constitutional: Alert uncomfortable  HENT: No signs of trauma, Bilateral external ears normal, Nose normal.   Eyes: Pupils are equal and reactive, Conjunctiva normal, Non-icteric.   Neck: Normal range of motion, No tenderness, Supple, No stridor.   Cardiovascular: Regular rate and rhythm, no murmurs.   Thorax & Lungs: Normal breath sounds, No respiratory distress, No wheezing, No chest tenderness.   Abdomen: Bowel sounds normal, right upper quadrant tenderness, positive Flood's  No masses, No pulsatile masses. No peritoneal signs.  Skin: Warm, Dry, No erythema, No rash.   Back: No bony tenderness, No CVA tenderness.   Extremities: Intact distal pulses, No edema, No tenderness, No cyanosis,  Negative Efrain's sign.   Musculoskeletal: Good range of motion in all major joints. No tenderness to palpation or major deformities noted.   Neurologic: Alert , Normal motor function, Normal sensory function, No focal deficits noted.   Psychiatric: Affect normal, Judgment normal, " Mood normal.               EKG/LABS  Labs Reviewed   CBC WITH DIFFERENTIAL - Abnormal; Notable for the following components:       Result Value    Neutrophils-Polys 79.30 (*)     Lymphocytes 13.10 (*)     Neutrophils (Absolute) 8.16 (*)     All other components within normal limits   COMP METABOLIC PANEL - Abnormal; Notable for the following components:    Co2 19 (*)     Glucose 109 (*)     Alkaline Phosphatase 114 (*)     All other components within normal limits   LIPASE   HCG QUAL SERUM   ESTIMATED GFR   URINALYSIS,CULTURE IF INDICATED       I have independently interpreted this EKG    RADIOLOGY/PROCEDURES   I have independently interpreted the diagnostic imaging associated with this visit and am waiting the final reading from the radiologist.   My preliminary interpretation is as follows: Gallstones    Radiologist interpretation:  US-RUQ   Final Result   :   Cholelithiasis with stable nonspecific borderline gallbladder wall thickening. If concern for acute cholecystitis, nuclear medicine HIDA scan may be performed.      Hepatomegaly.          COURSE & MEDICAL DECISION MAKING    ASSESSMENT, COURSE AND PLAN  Care Narrative: 5:48 AM  Patient is evaluated the bedside and chart is reviewed.  At this point symptoms concerning for biliary pathology, consideration for symptomatic cholelithiasis, cholecystitis, choledocholithiasis, pancreatitis.  She is no lower abdominal tenderness to suggest appendicitis although these were all considered.  Have ordered for ultrasound, diagnostic labs, IV fluids, Zofran, fentanyl for her pain    Patient is reevaluated, she is still having some pain although is improved after medications, pending ultrasound    Case is discussed with Dr. Orozco from general surgery will plan for OR    Patient is reevaluated and updated on all results,    Hydration: Based on the patient's presentation of Acute Vomiting the patient was given IV fluids. IV Hydration was used because oral hydration was not as  rapid as required. Upon recheck following hydration, the patient was improved.          PROBLEMS MANAGED  # Biliary colic.  Patient with recurrent and persistent abdominal pain in the setting of known gallstones.  At this point she has no elevation in her liver enzymes or bilirubin or findings of obstructive pathology on her ultrasound.  She does have cholelithiasis and some slight gallbladder wall thickening and with her persistent pain is admitted to the surgical service    DISPOSITION AND DISCUSSIONS  I have discussed management of the patient with the following physicians and KAI's:  Dr Orozco from gen surg    Patient is admitted in stable condition    FINAL DIAGNOSIS  1. Biliary colic    2. Nausea and vomiting, unspecified vomiting type           Electronically signed by: Cristhian Wong M.D., 6/5/2024 5:47 AM

## 2024-06-05 NOTE — OR NURSING
Srini [id number]  Informed pt surgery went well. Nurse will call . Pt reports pain 8/10. Denies nausea. Nurse told pt that nurse will give pain medications to help her feel better.

## 2024-06-05 NOTE — ED NOTES
Transport present to move patient to pre-op. All belongings with patient for transport.  ipad used to ensure clear communication. Patient remains AOX4, stable on RA, and aware of the POC. VSS.

## 2024-06-05 NOTE — ED NOTES
Rounded on patient. Warm blankets provided. Patient still expresses pain relief from previous medication. States no othyer needs at this time. Patient reminded about need for urine sample. Call light within reach. VSS.

## 2024-06-05 NOTE — ANESTHESIA PREPROCEDURE EVALUATION
Case: [id number] Date/Time: 06/05/24 1328    Procedure: CHOLECYSTECTOMY, LAPAROSCOPIC    Location: [facility] OR 03 / SURGERY [facility]    Surgeons: Giovanni Orozco M.D.            Relevant Problems   No relevant active problems       Physical Exam    Airway   Mallampati: II  TM distance: >3 FB  Neck ROM: full       Cardiovascular - normal exam  Rhythm: regular  Rate: normal  (-) murmur     Dental - normal exam           Pulmonary - normal exam  Breath sounds clear to auscultation     Abdominal    Neurological - normal exam                   Anesthesia Plan    ASA 2       Plan - general       Airway plan will be ETT          Induction: intravenous    Postoperative Plan: Postoperative administration of opioids is intended.    Pertinent diagnostic labs and testing reviewed    Informed Consent:    Anesthetic plan and risks discussed with patient.    Use of blood products discussed with: patient whom consented to blood products.

## 2024-06-05 NOTE — ANESTHESIA POSTPROCEDURE EVALUATION
Patient: Kiet Pollack    Procedure Summary       Date: 06/05/24 Room / Location: [facility] 03 / SURGERY [facility]    Anesthesia Start: 1406 Anesthesia Stop: 1501    Procedure: CHOLECYSTECTOMY, LAPAROSCOPIC (Abdomen) Diagnosis: (Intractable biliary colic)    Surgeons: Giovanni Orozco M.D. Responsible Provider: Doyle Griffin M.D.    Anesthesia Type: general ASA Status: 2            Final Anesthesia Type: general  Last vitals  BP   Blood Pressure: 129/79    Temp   36.4 °C (97.5 °F)    Pulse   87   Resp   20    SpO2   99 %      Anesthesia Post Evaluation    Patient location during evaluation: PACU  Patient participation: complete - patient participated  Level of consciousness: awake and alert  Pain score: 0    Airway patency: patent  Anesthetic complications: no  Cardiovascular status: hemodynamically stable  Respiratory status: acceptable  Hydration status: euvolemic    PONV: none          No notable events documented.     Nurse Pain Score: 0 (NPRS)

## 2024-06-05 NOTE — ED NOTES
Bedside report received from CAITLIN Wallace. Assumed patient care. Verified patient identification. Patient resting in bed, connected to monitor, respirations even and unlabored. Patient currently on RA,  Vital signs is stable. Standard safety precautions in place. Gurney in low position, side rail up for pt safety. Call light within reach.

## 2024-06-05 NOTE — ED NOTES
Telephone report to Adan at Pre-op. Patient schedule for surgery this afternoon. Patient aware of POC.

## 2024-06-05 NOTE — ANESTHESIA PROCEDURE NOTES
Airway    Date/Time: 6/5/2024 2:15 PM    Performed by: Doyle Griffin M.D.  Authorized by: Doyle Griffin M.D.    Location:  OR  Urgency:  Elective  Indications for Airway Management:  Anesthesia      Spontaneous Ventilation: absent    Sedation Level:  Deep  Preoxygenated: Yes    Patient Position:  Sniffing  Mask Difficulty Assessment:  1 - vent by mask  Final Airway Type:  Endotracheal airway  Final Endotracheal Airway:  ETT  Cuffed: Yes    Technique Used for Successful ETT Placement:  Video laryngoscopy    Insertion Site:  Oral  Blade Type:  Glide  Laryngoscope Blade/Videolaryngoscope Blade Size:  3  ETT Size (mm):  7.0  Measured from:  Teeth  ETT to Teeth (cm):  21  Placement Verified by: auscultation and capnometry    Cormack-Lehane Classification:  Grade I - full view of glottis  Number of Attempts at Approach:  1   No teeth contact

## 2024-06-06 NOTE — OR NURSING
Pt arrived via gurney from phase I. Pt mobilized to chair w/ staff assist. C/o abd pain, see mar.  brought back to bedside. Discussed poc.

## 2024-06-06 NOTE — OR NURSING
D/c instructions and Rx provided to pt and . Verbalized understanding, all questions answered. IV d/c'd, hemostasis achieved. Left via w/c w/ hospital escort. All belongings taken. No distress. Stable condition. All d/c criteria met.

## 2024-06-08 ENCOUNTER — HOSPITAL ENCOUNTER (OUTPATIENT)
Facility: MEDICAL CENTER | Age: 39
End: 2024-06-09
Attending: EMERGENCY MEDICINE | Admitting: SURGERY
Payer: COMMERCIAL

## 2024-06-08 ENCOUNTER — APPOINTMENT (OUTPATIENT)
Dept: RADIOLOGY | Facility: MEDICAL CENTER | Age: 39
End: 2024-06-08
Attending: EMERGENCY MEDICINE
Payer: COMMERCIAL

## 2024-06-08 DIAGNOSIS — R11.0 NAUSEA: ICD-10-CM

## 2024-06-08 DIAGNOSIS — K59.03 DRUG INDUCED CONSTIPATION: ICD-10-CM

## 2024-06-08 DIAGNOSIS — K80.00 CALCULUS OF GALLBLADDER WITH ACUTE CHOLECYSTITIS WITHOUT OBSTRUCTION: ICD-10-CM

## 2024-06-08 DIAGNOSIS — G89.18 POST-OPERATIVE PAIN: ICD-10-CM

## 2024-06-08 DIAGNOSIS — G89.18 POST-OP PAIN: ICD-10-CM

## 2024-06-08 LAB
ALBUMIN SERPL BCP-MCNC: 3.8 G/DL (ref 3.2–4.9)
ALBUMIN/GLOB SERPL: 1.2 G/DL
ALP SERPL-CCNC: 100 U/L (ref 30–99)
ALT SERPL-CCNC: 37 U/L (ref 2–50)
ANION GAP SERPL CALC-SCNC: 12 MMOL/L (ref 7–16)
APPEARANCE UR: CLEAR
AST SERPL-CCNC: 27 U/L (ref 12–45)
BASOPHILS # BLD AUTO: 0.3 % (ref 0–1.8)
BASOPHILS # BLD: 0.02 K/UL (ref 0–0.12)
BILIRUB SERPL-MCNC: 0.2 MG/DL (ref 0.1–1.5)
BILIRUB UR QL STRIP.AUTO: NEGATIVE
BUN SERPL-MCNC: 16 MG/DL (ref 8–22)
CALCIUM ALBUM COR SERPL-MCNC: 8.8 MG/DL (ref 8.5–10.5)
CALCIUM SERPL-MCNC: 8.6 MG/DL (ref 8.5–10.5)
CHLORIDE SERPL-SCNC: 103 MMOL/L (ref 96–112)
CO2 SERPL-SCNC: 22 MMOL/L (ref 20–33)
COLOR UR: YELLOW
CREAT SERPL-MCNC: 0.62 MG/DL (ref 0.5–1.4)
EOSINOPHIL # BLD AUTO: 0.07 K/UL (ref 0–0.51)
EOSINOPHIL NFR BLD: 1 % (ref 0–6.9)
ERYTHROCYTE [DISTWIDTH] IN BLOOD BY AUTOMATED COUNT: 47.1 FL (ref 35.9–50)
GFR SERPLBLD CREATININE-BSD FMLA CKD-EPI: 116 ML/MIN/1.73 M 2
GLOBULIN SER CALC-MCNC: 3.2 G/DL (ref 1.9–3.5)
GLUCOSE BLD STRIP.AUTO-MCNC: 104 MG/DL (ref 65–99)
GLUCOSE SERPL-MCNC: 102 MG/DL (ref 65–99)
GLUCOSE UR STRIP.AUTO-MCNC: NEGATIVE MG/DL
HCG SERPL QL: NEGATIVE
HCT VFR BLD AUTO: 37.6 % (ref 37–47)
HGB BLD-MCNC: 12.2 G/DL (ref 12–16)
IMM GRANULOCYTES # BLD AUTO: 0.02 K/UL (ref 0–0.11)
IMM GRANULOCYTES NFR BLD AUTO: 0.3 % (ref 0–0.9)
KETONES UR STRIP.AUTO-MCNC: 15 MG/DL
LEUKOCYTE ESTERASE UR QL STRIP.AUTO: NEGATIVE
LIPASE SERPL-CCNC: 19 U/L (ref 11–82)
LYMPHOCYTES # BLD AUTO: 1.35 K/UL (ref 1–4.8)
LYMPHOCYTES NFR BLD: 19.1 % (ref 22–41)
MCH RBC QN AUTO: 28.6 PG (ref 27–33)
MCHC RBC AUTO-ENTMCNC: 32.4 G/DL (ref 32.2–35.5)
MCV RBC AUTO: 88.3 FL (ref 81.4–97.8)
MICRO URNS: ABNORMAL
MONOCYTES # BLD AUTO: 0.61 K/UL (ref 0–0.85)
MONOCYTES NFR BLD AUTO: 8.6 % (ref 0–13.4)
NEUTROPHILS # BLD AUTO: 5 K/UL (ref 1.82–7.42)
NEUTROPHILS NFR BLD: 70.7 % (ref 44–72)
NITRITE UR QL STRIP.AUTO: NEGATIVE
NRBC # BLD AUTO: 0 K/UL
NRBC BLD-RTO: 0 /100 WBC (ref 0–0.2)
PH UR STRIP.AUTO: 6.5 [PH] (ref 5–8)
PLATELET # BLD AUTO: 261 K/UL (ref 164–446)
PMV BLD AUTO: 11.2 FL (ref 9–12.9)
POTASSIUM SERPL-SCNC: 3.8 MMOL/L (ref 3.6–5.5)
PROT SERPL-MCNC: 7 G/DL (ref 6–8.2)
PROT UR QL STRIP: NEGATIVE MG/DL
RBC # BLD AUTO: 4.26 M/UL (ref 4.2–5.4)
RBC UR QL AUTO: NEGATIVE
SODIUM SERPL-SCNC: 137 MMOL/L (ref 135–145)
SP GR UR STRIP.AUTO: >=1.045
UROBILINOGEN UR STRIP.AUTO-MCNC: 0.2 MG/DL
WBC # BLD AUTO: 7.1 K/UL (ref 4.8–10.8)

## 2024-06-08 PROCEDURE — 700111 HCHG RX REV CODE 636 W/ 250 OVERRIDE (IP): Performed by: EMERGENCY MEDICINE

## 2024-06-08 PROCEDURE — 81003 URINALYSIS AUTO W/O SCOPE: CPT

## 2024-06-08 PROCEDURE — 96376 TX/PRO/DX INJ SAME DRUG ADON: CPT

## 2024-06-08 PROCEDURE — 83690 ASSAY OF LIPASE: CPT

## 2024-06-08 PROCEDURE — 700117 HCHG RX CONTRAST REV CODE 255: Performed by: EMERGENCY MEDICINE

## 2024-06-08 PROCEDURE — G0378 HOSPITAL OBSERVATION PER HR: HCPCS

## 2024-06-08 PROCEDURE — 700111 HCHG RX REV CODE 636 W/ 250 OVERRIDE (IP): Mod: JZ | Performed by: SURGERY

## 2024-06-08 PROCEDURE — 84703 CHORIONIC GONADOTROPIN ASSAY: CPT

## 2024-06-08 PROCEDURE — 96375 TX/PRO/DX INJ NEW DRUG ADDON: CPT

## 2024-06-08 PROCEDURE — 700111 HCHG RX REV CODE 636 W/ 250 OVERRIDE (IP)

## 2024-06-08 PROCEDURE — 82962 GLUCOSE BLOOD TEST: CPT

## 2024-06-08 PROCEDURE — 80053 COMPREHEN METABOLIC PANEL: CPT

## 2024-06-08 PROCEDURE — 99024 POSTOP FOLLOW-UP VISIT: CPT | Performed by: SURGERY

## 2024-06-08 PROCEDURE — 85025 COMPLETE CBC W/AUTO DIFF WBC: CPT

## 2024-06-08 PROCEDURE — A9270 NON-COVERED ITEM OR SERVICE: HCPCS | Performed by: SURGERY

## 2024-06-08 PROCEDURE — 96374 THER/PROPH/DIAG INJ IV PUSH: CPT

## 2024-06-08 PROCEDURE — 700102 HCHG RX REV CODE 250 W/ 637 OVERRIDE(OP): Performed by: SURGERY

## 2024-06-08 PROCEDURE — 36415 COLL VENOUS BLD VENIPUNCTURE: CPT

## 2024-06-08 PROCEDURE — 74177 CT ABD & PELVIS W/CONTRAST: CPT

## 2024-06-08 PROCEDURE — 99285 EMERGENCY DEPT VISIT HI MDM: CPT

## 2024-06-08 RX ORDER — OXYCODONE HYDROCHLORIDE AND ACETAMINOPHEN 5; 325 MG/1; MG/1
1 TABLET ORAL ONCE
Status: DISCONTINUED | OUTPATIENT
Start: 2024-06-08 | End: 2024-06-08

## 2024-06-08 RX ORDER — HYDROMORPHONE HYDROCHLORIDE 1 MG/ML
0.5 INJECTION, SOLUTION INTRAMUSCULAR; INTRAVENOUS; SUBCUTANEOUS
Status: DISCONTINUED | OUTPATIENT
Start: 2024-06-08 | End: 2024-06-08

## 2024-06-08 RX ORDER — HYDROMORPHONE HYDROCHLORIDE 1 MG/ML
0.5 INJECTION, SOLUTION INTRAMUSCULAR; INTRAVENOUS; SUBCUTANEOUS ONCE
Status: COMPLETED | OUTPATIENT
Start: 2024-06-08 | End: 2024-06-08

## 2024-06-08 RX ORDER — ENOXAPARIN SODIUM 100 MG/ML
40 INJECTION SUBCUTANEOUS DAILY
Status: DISCONTINUED | OUTPATIENT
Start: 2024-06-09 | End: 2024-06-09 | Stop reason: HOSPADM

## 2024-06-08 RX ORDER — ENEMA 19; 7 G/133ML; G/133ML
1 ENEMA RECTAL
Status: DISCONTINUED | OUTPATIENT
Start: 2024-06-08 | End: 2024-06-09 | Stop reason: HOSPADM

## 2024-06-08 RX ORDER — AMOXICILLIN 250 MG
1 CAPSULE ORAL NIGHTLY
Status: DISCONTINUED | OUTPATIENT
Start: 2024-06-08 | End: 2024-06-09 | Stop reason: HOSPADM

## 2024-06-08 RX ORDER — MORPHINE SULFATE 4 MG/ML
4 INJECTION INTRAVENOUS ONCE
Status: COMPLETED | OUTPATIENT
Start: 2024-06-08 | End: 2024-06-08

## 2024-06-08 RX ORDER — ONDANSETRON 4 MG/1
4 TABLET, ORALLY DISINTEGRATING ORAL EVERY 4 HOURS PRN
Status: DISCONTINUED | OUTPATIENT
Start: 2024-06-08 | End: 2024-06-08

## 2024-06-08 RX ORDER — DEXTROSE MONOHYDRATE 25 G/50ML
25 INJECTION, SOLUTION INTRAVENOUS
Status: DISCONTINUED | OUTPATIENT
Start: 2024-06-08 | End: 2024-06-08

## 2024-06-08 RX ORDER — ONDANSETRON 2 MG/ML
4 INJECTION INTRAMUSCULAR; INTRAVENOUS ONCE
Status: COMPLETED | OUTPATIENT
Start: 2024-06-08 | End: 2024-06-08

## 2024-06-08 RX ORDER — AMOXICILLIN 250 MG
1 CAPSULE ORAL
Status: DISCONTINUED | OUTPATIENT
Start: 2024-06-08 | End: 2024-06-09 | Stop reason: HOSPADM

## 2024-06-08 RX ORDER — ACETAMINOPHEN 500 MG
1000 TABLET ORAL EVERY 6 HOURS
Status: DISCONTINUED | OUTPATIENT
Start: 2024-06-08 | End: 2024-06-09 | Stop reason: HOSPADM

## 2024-06-08 RX ORDER — KETOROLAC TROMETHAMINE 15 MG/ML
15 INJECTION, SOLUTION INTRAMUSCULAR; INTRAVENOUS EVERY 6 HOURS
Status: DISCONTINUED | OUTPATIENT
Start: 2024-06-08 | End: 2024-06-09 | Stop reason: HOSPADM

## 2024-06-08 RX ORDER — METAXALONE 800 MG/1
800 TABLET ORAL 3 TIMES DAILY
Status: DISCONTINUED | OUTPATIENT
Start: 2024-06-08 | End: 2024-06-09 | Stop reason: HOSPADM

## 2024-06-08 RX ORDER — OXYCODONE HYDROCHLORIDE 5 MG/1
5 TABLET ORAL
Status: DISCONTINUED | OUTPATIENT
Start: 2024-06-08 | End: 2024-06-09 | Stop reason: HOSPADM

## 2024-06-08 RX ORDER — GABAPENTIN 100 MG/1
100 CAPSULE ORAL EVERY 8 HOURS
Status: DISCONTINUED | OUTPATIENT
Start: 2024-06-08 | End: 2024-06-09 | Stop reason: HOSPADM

## 2024-06-08 RX ORDER — ACETAMINOPHEN 500 MG
1000 TABLET ORAL EVERY 6 HOURS PRN
Status: DISCONTINUED | OUTPATIENT
Start: 2024-06-13 | End: 2024-06-09 | Stop reason: HOSPADM

## 2024-06-08 RX ORDER — OXYCODONE HYDROCHLORIDE 10 MG/1
10 TABLET ORAL
Status: DISCONTINUED | OUTPATIENT
Start: 2024-06-08 | End: 2024-06-09 | Stop reason: HOSPADM

## 2024-06-08 RX ORDER — DOCUSATE SODIUM 100 MG/1
100 CAPSULE, LIQUID FILLED ORAL 2 TIMES DAILY
Status: DISCONTINUED | OUTPATIENT
Start: 2024-06-08 | End: 2024-06-09 | Stop reason: HOSPADM

## 2024-06-08 RX ORDER — BISACODYL 10 MG
10 SUPPOSITORY, RECTAL RECTAL
Status: DISCONTINUED | OUTPATIENT
Start: 2024-06-08 | End: 2024-06-09 | Stop reason: HOSPADM

## 2024-06-08 RX ORDER — ONDANSETRON 2 MG/ML
4 INJECTION INTRAMUSCULAR; INTRAVENOUS EVERY 4 HOURS PRN
Status: DISCONTINUED | OUTPATIENT
Start: 2024-06-08 | End: 2024-06-09 | Stop reason: HOSPADM

## 2024-06-08 RX ORDER — ONDANSETRON 4 MG/1
4 TABLET, ORALLY DISINTEGRATING ORAL ONCE
Status: DISCONTINUED | OUTPATIENT
Start: 2024-06-08 | End: 2024-06-08

## 2024-06-08 RX ORDER — CYCLOBENZAPRINE HCL 10 MG
10 TABLET ORAL ONCE
Status: DISCONTINUED | OUTPATIENT
Start: 2024-06-08 | End: 2024-06-08

## 2024-06-08 RX ORDER — POLYETHYLENE GLYCOL 3350 17 G/17G
1 POWDER, FOR SOLUTION ORAL 2 TIMES DAILY
Status: DISCONTINUED | OUTPATIENT
Start: 2024-06-08 | End: 2024-06-09 | Stop reason: HOSPADM

## 2024-06-08 RX ADMIN — METAXALONE 800 MG: 800 TABLET ORAL at 09:07

## 2024-06-08 RX ADMIN — MORPHINE SULFATE 4 MG: 4 INJECTION INTRAVENOUS at 04:12

## 2024-06-08 RX ADMIN — ACETAMINOPHEN 1000 MG: 500 TABLET, FILM COATED ORAL at 09:08

## 2024-06-08 RX ADMIN — METAXALONE 800 MG: 800 TABLET ORAL at 17:35

## 2024-06-08 RX ADMIN — HYDROMORPHONE HYDROCHLORIDE 0.5 MG: 1 INJECTION, SOLUTION INTRAMUSCULAR; INTRAVENOUS; SUBCUTANEOUS at 04:48

## 2024-06-08 RX ADMIN — KETOROLAC TROMETHAMINE 15 MG: 15 INJECTION, SOLUTION INTRAMUSCULAR; INTRAVENOUS at 23:53

## 2024-06-08 RX ADMIN — ONDANSETRON 4 MG: 2 INJECTION INTRAMUSCULAR; INTRAVENOUS at 22:03

## 2024-06-08 RX ADMIN — OXYCODONE HYDROCHLORIDE 10 MG: 10 TABLET ORAL at 11:18

## 2024-06-08 RX ADMIN — SENNOSIDES AND DOCUSATE SODIUM 1 TABLET: 50; 8.6 TABLET ORAL at 21:02

## 2024-06-08 RX ADMIN — ACETAMINOPHEN 1000 MG: 500 TABLET, FILM COATED ORAL at 17:35

## 2024-06-08 RX ADMIN — ONDANSETRON 4 MG: 2 INJECTION INTRAMUSCULAR; INTRAVENOUS at 04:11

## 2024-06-08 RX ADMIN — IOHEXOL 100 ML: 350 INJECTION, SOLUTION INTRAVENOUS at 05:45

## 2024-06-08 RX ADMIN — POLYETHYLENE GLYCOL 3350 1 PACKET: 17 POWDER, FOR SOLUTION ORAL at 17:36

## 2024-06-08 RX ADMIN — GABAPENTIN 100 MG: 100 CAPSULE ORAL at 15:30

## 2024-06-08 RX ADMIN — ACETAMINOPHEN 1000 MG: 500 TABLET, FILM COATED ORAL at 23:53

## 2024-06-08 RX ADMIN — POLYETHYLENE GLYCOL 3350 1 PACKET: 17 POWDER, FOR SOLUTION ORAL at 09:08

## 2024-06-08 RX ADMIN — ONDANSETRON 4 MG: 2 INJECTION INTRAMUSCULAR; INTRAVENOUS at 11:25

## 2024-06-08 RX ADMIN — ACETAMINOPHEN 1000 MG: 500 TABLET, FILM COATED ORAL at 11:18

## 2024-06-08 RX ADMIN — KETOROLAC TROMETHAMINE 15 MG: 15 INJECTION, SOLUTION INTRAMUSCULAR; INTRAVENOUS at 17:35

## 2024-06-08 RX ADMIN — KETOROLAC TROMETHAMINE 15 MG: 15 INJECTION, SOLUTION INTRAMUSCULAR; INTRAVENOUS at 11:19

## 2024-06-08 RX ADMIN — GABAPENTIN 100 MG: 100 CAPSULE ORAL at 09:07

## 2024-06-08 RX ADMIN — GABAPENTIN 100 MG: 100 CAPSULE ORAL at 21:02

## 2024-06-08 RX ADMIN — DOCUSATE SODIUM 100 MG: 100 CAPSULE, LIQUID FILLED ORAL at 09:11

## 2024-06-08 RX ADMIN — HYDROMORPHONE HYDROCHLORIDE 0.5 MG: 1 INJECTION, SOLUTION INTRAMUSCULAR; INTRAVENOUS; SUBCUTANEOUS at 22:47

## 2024-06-08 RX ADMIN — METAXALONE 800 MG: 800 TABLET ORAL at 13:31

## 2024-06-08 RX ADMIN — OXYCODONE HYDROCHLORIDE 10 MG: 10 TABLET ORAL at 21:29

## 2024-06-08 RX ADMIN — DOCUSATE SODIUM 100 MG: 100 CAPSULE, LIQUID FILLED ORAL at 17:35

## 2024-06-08 RX ADMIN — KETOROLAC TROMETHAMINE 15 MG: 15 INJECTION, SOLUTION INTRAMUSCULAR; INTRAVENOUS at 07:42

## 2024-06-08 SDOH — ECONOMIC STABILITY: TRANSPORTATION INSECURITY
IN THE PAST 12 MONTHS, HAS THE LACK OF TRANSPORTATION KEPT YOU FROM MEDICAL APPOINTMENTS OR FROM GETTING MEDICATIONS?: NO

## 2024-06-08 SDOH — ECONOMIC STABILITY: TRANSPORTATION INSECURITY
IN THE PAST 12 MONTHS, HAS LACK OF RELIABLE TRANSPORTATION KEPT YOU FROM MEDICAL APPOINTMENTS, MEETINGS, WORK OR FROM GETTING THINGS NEEDED FOR DAILY LIVING?: NO

## 2024-06-08 ASSESSMENT — LIFESTYLE VARIABLES
HAVE YOU EVER FELT YOU SHOULD CUT DOWN ON YOUR DRINKING: NO
HOW MANY TIMES IN THE PAST YEAR HAVE YOU HAD 5 OR MORE DRINKS IN A DAY: 0
EVER FELT BAD OR GUILTY ABOUT YOUR DRINKING: NO
TOTAL SCORE: 0
CONSUMPTION TOTAL: NEGATIVE
HAVE PEOPLE ANNOYED YOU BY CRITICIZING YOUR DRINKING: NO
TOTAL SCORE: 0
AVERAGE NUMBER OF DAYS PER WEEK YOU HAVE A DRINK CONTAINING ALCOHOL: 0
ALCOHOL_USE: NO
TOTAL SCORE: 0
ON A TYPICAL DAY WHEN YOU DRINK ALCOHOL HOW MANY DRINKS DO YOU HAVE: 0
EVER HAD A DRINK FIRST THING IN THE MORNING TO STEADY YOUR NERVES TO GET RID OF A HANGOVER: NO

## 2024-06-08 ASSESSMENT — SOCIAL DETERMINANTS OF HEALTH (SDOH)
IN THE PAST 12 MONTHS, HAS THE ELECTRIC, GAS, OIL, OR WATER COMPANY THREATENED TO SHUT OFF SERVICE IN YOUR HOME?: NO
WITHIN THE LAST YEAR, HAVE YOU BEEN KICKED, HIT, SLAPPED, OR OTHERWISE PHYSICALLY HURT BY YOUR PARTNER OR EX-PARTNER?: NO
WITHIN THE PAST 12 MONTHS, YOU WORRIED THAT YOUR FOOD WOULD RUN OUT BEFORE YOU GOT THE MONEY TO BUY MORE: NEVER TRUE
WITHIN THE PAST 12 MONTHS, THE FOOD YOU BOUGHT JUST DIDN'T LAST AND YOU DIDN'T HAVE MONEY TO GET MORE: NEVER TRUE
WITHIN THE LAST YEAR, HAVE YOU BEEN AFRAID OF YOUR PARTNER OR EX-PARTNER?: NO
WITHIN THE LAST YEAR, HAVE YOU BEEN HUMILIATED OR EMOTIONALLY ABUSED IN OTHER WAYS BY YOUR PARTNER OR EX-PARTNER?: NO
WITHIN THE LAST YEAR, HAVE TO BEEN RAPED OR FORCED TO HAVE ANY KIND OF SEXUAL ACTIVITY BY YOUR PARTNER OR EX-PARTNER?: NO

## 2024-06-08 ASSESSMENT — FIBROSIS 4 INDEX
FIB4 SCORE: 0.66
FIB4 SCORE: 0.5

## 2024-06-08 ASSESSMENT — COPD QUESTIONNAIRES
COPD SCREENING SCORE: 0
DURING THE PAST 4 WEEKS HOW MUCH DID YOU FEEL SHORT OF BREATH: NONE/LITTLE OF THE TIME
DO YOU EVER COUGH UP ANY MUCUS OR PHLEGM?: NO/ONLY WITH OCCASIONAL COLDS OR INFECTIONS
HAVE YOU SMOKED AT LEAST 100 CIGARETTES IN YOUR ENTIRE LIFE: NO/DON'T KNOW

## 2024-06-08 ASSESSMENT — PAIN DESCRIPTION - PAIN TYPE
TYPE: SURGICAL PAIN
TYPE: ACUTE PAIN
TYPE: SURGICAL PAIN
TYPE: ACUTE PAIN
TYPE: ACUTE PAIN

## 2024-06-08 ASSESSMENT — PATIENT HEALTH QUESTIONNAIRE - PHQ9
1. LITTLE INTEREST OR PLEASURE IN DOING THINGS: NOT AT ALL
SUM OF ALL RESPONSES TO PHQ9 QUESTIONS 1 AND 2: 0
2. FEELING DOWN, DEPRESSED, IRRITABLE, OR HOPELESS: NOT AT ALL

## 2024-06-08 NOTE — PROGRESS NOTES
Patient moved from [facility] to [facility]. Patient ambulated handheld assist from [facility] to hospital bed. Bed in lowest position and locked with call light within reach. Patient declines pain intervention at this time.

## 2024-06-08 NOTE — PROGRESS NOTES
"  DATE: 6/8/2024    Hospital Day1  post operative pain .    INTERVAL EVENTS:  Pain better controlled after IV narcotics.   No leukocytosis.   Imaging consistent with recent post operative changes.     - mobilize patient.   - multimodal pain regimen.   - anticipate discharge home once sufficient pain management achieved.   - follow AM labs.     REVIEW OF SYSTEMS:  Comprehensive review of systems is negative with the exception of the aforementioned HPI, PMH, and PSH bullets in accordance with CMS guidelines.\    PHYSICAL EXAMINATION:  Vital Signs: /71   Pulse (!) 58   Temp 36.4 °C (97.5 °F) (Temporal)   Resp 18   Ht 1.575 m (5' 2\")   Wt 83.9 kg (184 lb 15.5 oz)   SpO2 94%   Physical Exam  Vitals and nursing note reviewed.   Constitutional:       General: She is not in acute distress.     Appearance: She is not ill-appearing.   HENT:      Head: Normocephalic.   Cardiovascular:      Rate and Rhythm: Normal rate.   Pulmonary:      Effort: Pulmonary effort is normal. No respiratory distress.   Abdominal:      General: There is no distension.      Palpations: Abdomen is soft.      Tenderness: There is abdominal tenderness. There is no guarding.      Comments: Port sites well approximated. No overt signs of infection    Neurological:      Mental Status: She is alert.         LABORATORY VALUES:  Recent Labs     06/08/24  0414   WBC 7.1   RBC 4.26   HEMOGLOBIN 12.2   HEMATOCRIT 37.6   MCV 88.3   MCH 28.6   MCHC 32.4   RDW 47.1   PLATELETCT 261   MPV 11.2     Recent Labs     06/08/24  0414   SODIUM 137   POTASSIUM 3.8   CHLORIDE 103   CO2 22   GLUCOSE 102*   BUN 16   CREATININE 0.62   CALCIUM 8.6     Recent Labs     06/08/24  0414   ASTSGOT 27   ALTSGPT 37   TBILIRUBIN 0.2   ALKPHOSPHAT 100*   GLOBULIN 3.2           IMAGING:  CT-ABDOMEN-PELVIS WITH   Final Result         1.  Intra-abdominal free air with minimal quantity of fluid along the margin of the liver, within the gallbladder fossa, and within the pelvis. " Findings are within expected limits for recent postoperative changes.   2.  Hepatomegaly          ASSESSMENT AND PLAN:  * Post-operative pain- (present on admission)  Assessment & Plan  Right upper quadrant pain worse with deep inspiration after laparoscopic cholecystectomy on 6/5/24.  Tender to palpation in the right upper quadrant.  Labs without significant abnormality.  CT scan with small amount of pneumoperitoneum above the liver and trace free fluid, expected post-operatively.  Favor diaphragm irritation due to pneumoperitoneum to be the likely source of her symptoms.  Will admit for observation, scheduled multimodal pain regimen, as well as IV analgesics given that she is unable to control her pain with oral medications at home.             ____________________________________     Ana Russo P.A.-C.    Discussed with Yousif Li MD     DD: 6/8/2024  12:48 PM

## 2024-06-08 NOTE — H&P
DATE OF CONSULTATION:  6/8/2024     REFERRING PHYSICIAN:   Malathi Flood M.D.     CONSULTING PHYSICIAN:  Mo Guzmán M.D.     REASON FOR CONSULTATION:  I have been asked by Dr. Flood to see the patient in surgical consultation for evaluation of post-operative pain.    HISTORY OF PRESENT ILLNESS: The patient is a 38 year-old  woman who underwent uncomplicated laparoscopic cholecystectomy on 6/5/24 who presents to the Emergency Department with a one-day history of progressive right upper quadrant  abdominal pain. The pain is associated with abdominal distension. She denies nausea, vomiting, or diarrhea. She has been unable to control her symptoms with oral medications at home. Labs are without significant abnormality. CT scan completed in the ER shows a small amount of pneumoperitoneum superior to the liver as well as trace fluid adjacent to the liver and in the gallbladder fossa. The patient denies any previous surgery for obstructive symptoms.    PAST MEDICAL HISTORY:  has a past medical history of Pyelonephritis complicating pregnancy (2000).    She has no past medical history of Blood transfusion without reported diagnosis or EMPHYSEMA.    PAST SURGICAL HISTORY:  has a past surgical history that includes rigo by laparoscopy (N/A, 6/5/2024).    ALLERGIES: No Known Allergies    CURRENT MEDICATIONS:    Home Medications       Reviewed by Wili Calvin R.N. (Registered Nurse) on 06/08/24 at 0331  Med List Status: Not Addressed     Medication Last Dose Status   acetaminophen (TYLENOL) 500 MG Tab  Active   ondansetron (ZOFRAN ODT) 4 MG TABLET DISPERSIBLE  Active   oxyCODONE immediate-release (ROXICODONE) 5 MG Tab  Active                  Audit from Redirected Encounters    **Home medications have not yet been reviewed for this encounter**         FAMILY HISTORY: family history includes Cancer in her maternal grandfather and paternal grandmother; Diabetes in her father; No Known Problems in her brother,  mother, and sister.    SOCIAL HISTORY:  reports that she has never smoked. She has never used smokeless tobacco. She reports that she does not drink alcohol and does not use drugs.    REVIEW OF SYSTEMS: Comprehensive review of systems is negative with the exception of the aforementioned HPI, PMH, and PSH bullets in accordance with CMS guidelines.    PHYSICAL EXAMINATION:    Physical Exam  Vitals and nursing note reviewed.   Constitutional:       General: She is not in acute distress.     Appearance: She is not ill-appearing or toxic-appearing.   HENT:      Head: Normocephalic and atraumatic.      Right Ear: External ear normal.      Left Ear: External ear normal.      Nose: Nose normal.      Mouth/Throat:      Mouth: Mucous membranes are moist.      Pharynx: Oropharynx is clear.   Eyes:      General: No scleral icterus.     Pupils: Pupils are equal, round, and reactive to light.   Cardiovascular:      Rate and Rhythm: Normal rate and regular rhythm.      Pulses: Normal pulses.   Pulmonary:      Effort: Pulmonary effort is normal. No respiratory distress.   Abdominal:      General: There is no distension.      Palpations: Abdomen is soft.      Tenderness: There is no guarding or rebound.   Genitourinary:     Comments: Pelvis stable.  Musculoskeletal:         General: No tenderness or deformity. Normal range of motion.      Cervical back: Normal range of motion and neck supple.   Skin:     General: Skin is warm and dry.      Capillary Refill: Capillary refill takes less than 2 seconds.      Coloration: Skin is not jaundiced.   Neurological:      General: No focal deficit present.      Mental Status: She is alert and oriented to person, place, and time.   Psychiatric:         Behavior: Behavior is cooperative.         LABORATORY VALUES:   Recent Labs     06/08/24  0414   WBC 7.1   RBC 4.26   HEMOGLOBIN 12.2   HEMATOCRIT 37.6   MCV 88.3   MCH 28.6   MCHC 32.4   RDW 47.1   PLATELETCT 261   MPV 11.2     Recent Labs      06/08/24  0414   SODIUM 137   POTASSIUM 3.8   CHLORIDE 103   CO2 22   GLUCOSE 102*   BUN 16   CREATININE 0.62   CALCIUM 8.6     Recent Labs     06/08/24  0414   ASTSGOT 27   ALTSGPT 37   TBILIRUBIN 0.2   ALKPHOSPHAT 100*   GLOBULIN 3.2            IMAGING:   CT-ABDOMEN-PELVIS WITH   Final Result         1.  Intra-abdominal free air with minimal quantity of fluid along the margin of the liver, within the gallbladder fossa, and within the pelvis. Findings are within expected limits for recent postoperative changes.   2.  Hepatomegaly          ASSESSMENT AND PLAN:     Post-operative pain- (present on admission)  Assessment & Plan  Right upper quadrant pain worse with deep inspiration after laparoscopic cholecystectomy on 6/5/24.  Tender to palpation in the right upper quadrant.  Labs without significant abnormality.  CT scan with small amount of pneumoperitoneum above the liver and trace free fluid, expected post-operatively.  Favor diaphragm irritation due to pneumoperitoneum to be the likely source of her symptoms.  Will admit for observation, scheduled multimodal pain regimen, as well as IV analgesics given that she is unable to control her pain with oral medications at home.        DISPOSITION: Outpatient Observation to Clinical Decision Making Unit (CDU). [facility] Acute Care Surgery Blue Service will follow.     ____________________________________     Mo Guzmán M.D.    DD: 6/8/2024  6:30 AM    AAST Grading System for EGS Conditions  ACS NSQIP Surgical Risk Calculator

## 2024-06-08 NOTE — ASSESSMENT & PLAN NOTE
Right upper quadrant pain worse with deep inspiration after laparoscopic cholecystectomy on 6/5/24.  Tender to palpation in the right upper quadrant.  Labs without significant abnormality.  CT scan with small amount of pneumoperitoneum above the liver and trace free fluid, expected post-operatively.  Favor diaphragm irritation due to pneumoperitoneum to be the likely source of her symptoms.  Will admit for observation, scheduled multimodal pain regimen, as well as IV analgesics given that she is unable to control her pain with oral medications at home.

## 2024-06-08 NOTE — ED TRIAGE NOTES
"Chief Complaint   Patient presents with    Abdominal Pain     Patient complaining of abdominal pain that started yesterday. Patient had gall bladder removal on Wednesday. Patient reports feeling like she has to burp but cannot. Patient taking tylenol at home, without relief.       Pt is alert and oriented, speaking in full sentences, follows commands and responds appropriately to questions. Resperations are even and unlabored.      Pt placed in lobby. Pt educated on triage process. Pt encouraged to alert staff for any changes.     Patient and staff wearing appropriate PPE.    /63   Pulse (!) 58   Temp 36.4 °C (97.5 °F) (Oral)   Resp 16   Ht 1.575 m (5' 2\")   Wt 83 kg (183 lb)   SpO2 100%    "

## 2024-06-08 NOTE — ED NOTES
Med Rec complete per patient's significant other at bedside   Allergies reviewed  Antibiotics in the past 30 days:no  Anticoagulant in past 14 days:no  Anticoagulant:no  Pharmacy patient utilizes:[facility] in [place]

## 2024-06-08 NOTE — ED NOTES
Bedside report received from off going RN/tech: Cris, assumed care of patient.  POC discussed with patient. Call light within reach, all needs addressed at this time.       Fall risk interventions in place: Move the patient closer to the nurse's station, Patient's personal possessions are with in their safe reach, Place socks on patient, Place fall risk sign on patient's door, and Keep floor surfaces clean and dry (all applicable per Juliaetta Fall risk assessment)   Continuous monitoring: Cardiac Leads, Pulse Ox, or Blood Pressure  IVF/IV medications: Not Applicable   Oxygen: Room Air  Bedside sitter: Not Applicable   Isolation: Not Applicable

## 2024-06-08 NOTE — ED PROVIDER NOTES
ED Provider Note    Scribed for Malathi Flood M.D. by Roosevelt Delcid. 6/8/2024, 3:59 AM.    Primary care provider: Pcp Pt States None  Means of arrival: Walk-in  History obtained from: Patient and her significant other  History limited by: Language, significant other translated.    CHIEF COMPLAINT  Chief Complaint   Patient presents with    Abdominal Pain     Patient complaining of abdominal pain that started yesterday. Patient had gall bladder removal on Wednesday. Patient reports feeling like she has to burp but cannot. Patient taking tylenol at home, without relief.     HPI/ROS  Kiet Pollack is a 38 y.o. female who presents to the Emergency Department with abdominal pain onset yesterday. The patient's significant other explains the patient had a cholecystectomy on 6/5/24.  Since the surgery patient has had persistent abdominal pain that is not relieved with the pain medication she was prescribed.  She states that it is painful to take a deep breath secondary to her abdominal pain.  She also reports poor appetite.  The patient explains she feels like she has air buildup in her abdomen. The patient adds that she feels like she needs to burp but is unable to do so due to the pressure in her abdomen. The significant other notes that the patient has tried taking her prescribed pain medication once and Tylenol multiple times without alleviation.     EXTERNAL RECORDS REVIEWED  Hospital records reviewed which show she had cholecystectomy on 6/5 by Dr. Orozco.    LIMITATION TO HISTORY   Select: Language Israeli, significant other translated.     OUTSIDE HISTORIAN(S):  Significant other was present at bedside to provide additional context to history.    PAST MEDICAL HISTORY   has a past medical history of Pyelonephritis complicating pregnancy (2000).    SURGICAL HISTORY   has a past surgical history that includes rigo by laparoscopy (N/A, 6/5/2024).    SOCIAL HISTORY  Social History     Tobacco Use    Smoking status:  "Never    Smokeless tobacco: Never   Vaping Use    Vaping status: Never Used   Substance Use Topics    Alcohol use: No    Drug use: No      Social History     Substance and Sexual Activity   Drug Use No     FAMILY HISTORY  Family History   Problem Relation Age of Onset    Diabetes Father     No Known Problems Mother     Cancer Maternal Grandfather         lung    Cancer Paternal Grandmother         bones    No Known Problems Sister     No Known Problems Brother      CURRENT MEDICATIONS  Home Medications       Reviewed by Roger Viramontes (Pharmacy Tech) on 06/08/24 at 0655  Med List Status: Complete     Medication Last Dose Status   acetaminophen (TYLENOL) 500 MG Tab 6/8/2024 Active   ondansetron (ZOFRAN ODT) 4 MG TABLET DISPERSIBLE NOT TAKING Active   oxyCODONE immediate-release (ROXICODONE) 5 MG Tab 6/7/2024 Active                  Audit from Redirected Encounters    **Home medications have not yet been reviewed for this encounter**       ALLERGIES  No Known Allergies    PHYSICAL EXAM  VITAL SIGNS: /63   Pulse (!) 58   Temp 36.4 °C (97.5 °F) (Oral)   Resp 16   Ht 1.575 m (5' 2\")   Wt 83 kg (183 lb)   LMP  (LMP Unknown)   SpO2 100%   BMI 33.47 kg/m²     Nursing note and vitals reviewed.  Constitutional: Well-developed and well-nourished.  Appears uncomfortable  HENT: Head is normocephalic and atraumatic.  Eyes: extra-ocular movements intact  Cardiovascular: Bradycardic rate and regular rhythm. No murmur heard.  Pulmonary/Chest: Breath sounds normal. No wheezes or rales.   Abdominal: Soft. No distention. Mild right upper quadrant tenderness. Surgical incisions are clean, dry, and intact.   Musculoskeletal: Extremities exhibit normal range of motion without edema or tenderness.   Neurological: Awake and alert  Skin: Skin is warm and dry. No rash.    DIAGNOSTIC STUDIES:  LABS  Labs Reviewed   CBC WITH DIFFERENTIAL - Abnormal; Notable for the following components:       Result Value    Lymphocytes " 19.10 (*)     All other components within normal limits   COMP METABOLIC PANEL - Abnormal; Notable for the following components:    Glucose 102 (*)     Alkaline Phosphatase 100 (*)     All other components within normal limits   URINALYSIS,CULTURE IF INDICATED - Abnormal; Notable for the following components:    Specific Gravity >=1.045 (*)     Ketones 15 (*)     All other components within normal limits   POCT GLUCOSE DEVICE RESULTS - Abnormal; Notable for the following components:    POC Glucose, Blood 104 (*)     All other components within normal limits   LIPASE   HCG QUAL SERUM   ESTIMATED GFR   All labs reviewed and independently interpreted by myself    RADIOLOGY  Final interpretation by radiology demonstrates:  CT-ABDOMEN-PELVIS WITH   Final Result         1.  Intra-abdominal free air with minimal quantity of fluid along the margin of the liver, within the gallbladder fossa, and within the pelvis. Findings are within expected limits for recent postoperative changes.   2.  Hepatomegaly      The radiologist's interpretation of all radiological studies have been reviewed by me.    COURSE & MEDICAL DECISION MAKING    INITIAL ASSESSMENT, ED COURSE AND PLAN    Patient is a 38-year-old female who presents for evaluation of abdominal pain.  Differential diagnosis includes postoperative pain, postoperative hematoma, postoperative seroma.  Diagnostic workup includes labs and CT of the abdomen.    Patient's initial vitals are within normal limits.   She is treated initially with morphine with no relief in her symptoms.  Is further treated with Dilaudid but has ongoing pain.  Labs returned and are unremarkable.  No elevation of LFTs or lipase.  CT of the abdomen returns and demonstrates no acute findings, normal postoperative changes of pneumoperitoneum are noted.  At this time I trialed oral medications of Percocet and Flexeril but patient still having ongoing pain.  Therefore surgical team is consulted who will  hospitalize her for ongoing pain management postoperatively.  Patient is hospitalized in guarded condition.       REASSESSMENTS   4:05 AM - Patient seen and examined at bedside. Discussed plan of care, including labs and a CT-scan to evaluate the patient's abdomen. Patient agrees to the plan of care. Differential diagnoses include but not limited to: See above.    4:43 AM - The patient is still having pain, will try Dilaudid.     5:58 AM - Patient was reevaluated at bedside.     6:17 AM - Spoke with Dr. Guzmán (Surgery) about the patient's condition. He will visit with the patient and evaluate for hospitalization.       DISPOSITION AND DISCUSSIONS  I have discussed management of the patient with the following physicians and KAI's:  Dr. Guzmán (surgery)    Discussion of management with other QHP or appropriate source(s): None     Escalation of care considered, and ultimately not performed:see above    Barriers to care at this time, including but not limited to: Patient does not have established PCP.     Decision tools and prescription drugs considered including, but not limited to: see above.    DISPOSITION:  Patient will be hospitalized by Dr. Guzmán (Surgery) in guarded condition.    FINAL IMPRESSION  1. Post-op pain         Roosevelt CHRISTIE (Scribe), am scribing for, and in the presence of, Malathi Flood M.D..    Electronically signed by: Roosevelt Delcid ([place]), 6/8/2024    Malathi CHRISTIE M.D. personally performed the services described in this documentation, as scribed by Roosevelt Delcid in my presence, and it is both accurate and complete.    The note accurately reflects work and decisions made by me.  Malathi Flood M.D.  6/8/2024  11:19 AM

## 2024-06-08 NOTE — PROGRESS NOTES
Patient arrived to the CDU via gurney to the hallway. Privacy curtain provided and additional seat for family.

## 2024-06-09 ENCOUNTER — PHARMACY VISIT (OUTPATIENT)
Dept: PHARMACY | Facility: MEDICAL CENTER | Age: 39
End: 2024-06-09
Payer: COMMERCIAL

## 2024-06-09 VITALS
RESPIRATION RATE: 16 BRPM | SYSTOLIC BLOOD PRESSURE: 106 MMHG | HEIGHT: 62 IN | DIASTOLIC BLOOD PRESSURE: 62 MMHG | TEMPERATURE: 98.2 F | HEART RATE: 63 BPM | OXYGEN SATURATION: 94 % | BODY MASS INDEX: 34.04 KG/M2 | WEIGHT: 184.97 LBS

## 2024-06-09 LAB
ALBUMIN SERPL BCP-MCNC: 3.6 G/DL (ref 3.2–4.9)
ALBUMIN/GLOB SERPL: 1.2 G/DL
ALP SERPL-CCNC: 112 U/L (ref 30–99)
ALT SERPL-CCNC: 46 U/L (ref 2–50)
ANION GAP SERPL CALC-SCNC: 9 MMOL/L (ref 7–16)
AST SERPL-CCNC: 26 U/L (ref 12–45)
BASOPHILS # BLD AUTO: 0.2 % (ref 0–1.8)
BASOPHILS # BLD: 0.02 K/UL (ref 0–0.12)
BILIRUB SERPL-MCNC: 0.4 MG/DL (ref 0.1–1.5)
BUN SERPL-MCNC: 10 MG/DL (ref 8–22)
CALCIUM ALBUM COR SERPL-MCNC: 8.8 MG/DL (ref 8.5–10.5)
CALCIUM SERPL-MCNC: 8.5 MG/DL (ref 8.5–10.5)
CHLORIDE SERPL-SCNC: 102 MMOL/L (ref 96–112)
CO2 SERPL-SCNC: 25 MMOL/L (ref 20–33)
CREAT SERPL-MCNC: 0.63 MG/DL (ref 0.5–1.4)
EOSINOPHIL # BLD AUTO: 0.03 K/UL (ref 0–0.51)
EOSINOPHIL NFR BLD: 0.3 % (ref 0–6.9)
ERYTHROCYTE [DISTWIDTH] IN BLOOD BY AUTOMATED COUNT: 45.7 FL (ref 35.9–50)
GFR SERPLBLD CREATININE-BSD FMLA CKD-EPI: 115 ML/MIN/1.73 M 2
GLOBULIN SER CALC-MCNC: 3 G/DL (ref 1.9–3.5)
GLUCOSE SERPL-MCNC: 95 MG/DL (ref 65–99)
HCT VFR BLD AUTO: 39 % (ref 37–47)
HGB BLD-MCNC: 13 G/DL (ref 12–16)
IMM GRANULOCYTES # BLD AUTO: 0.24 K/UL (ref 0–0.11)
IMM GRANULOCYTES NFR BLD AUTO: 2.1 % (ref 0–0.9)
LYMPHOCYTES # BLD AUTO: 1.43 K/UL (ref 1–4.8)
LYMPHOCYTES NFR BLD: 12.7 % (ref 22–41)
MCH RBC QN AUTO: 29 PG (ref 27–33)
MCHC RBC AUTO-ENTMCNC: 33.3 G/DL (ref 32.2–35.5)
MCV RBC AUTO: 87.1 FL (ref 81.4–97.8)
MONOCYTES # BLD AUTO: 0.81 K/UL (ref 0–0.85)
MONOCYTES NFR BLD AUTO: 7.2 % (ref 0–13.4)
NEUTROPHILS # BLD AUTO: 8.72 K/UL (ref 1.82–7.42)
NEUTROPHILS NFR BLD: 77.5 % (ref 44–72)
NRBC # BLD AUTO: 0 K/UL
NRBC BLD-RTO: 0 /100 WBC (ref 0–0.2)
PLATELET # BLD AUTO: 250 K/UL (ref 164–446)
PMV BLD AUTO: 10.6 FL (ref 9–12.9)
POTASSIUM SERPL-SCNC: 4.3 MMOL/L (ref 3.6–5.5)
PROT SERPL-MCNC: 6.6 G/DL (ref 6–8.2)
RBC # BLD AUTO: 4.48 M/UL (ref 4.2–5.4)
SODIUM SERPL-SCNC: 136 MMOL/L (ref 135–145)
WBC # BLD AUTO: 11.3 K/UL (ref 4.8–10.8)

## 2024-06-09 PROCEDURE — 85025 COMPLETE CBC W/AUTO DIFF WBC: CPT

## 2024-06-09 PROCEDURE — RXMED WILLOW AMBULATORY MEDICATION CHARGE: Performed by: PHYSICIAN ASSISTANT

## 2024-06-09 PROCEDURE — 96376 TX/PRO/DX INJ SAME DRUG ADON: CPT

## 2024-06-09 PROCEDURE — 80053 COMPREHEN METABOLIC PANEL: CPT

## 2024-06-09 PROCEDURE — 700102 HCHG RX REV CODE 250 W/ 637 OVERRIDE(OP): Performed by: SURGERY

## 2024-06-09 PROCEDURE — G0378 HOSPITAL OBSERVATION PER HR: HCPCS

## 2024-06-09 PROCEDURE — A9270 NON-COVERED ITEM OR SERVICE: HCPCS | Performed by: SURGERY

## 2024-06-09 PROCEDURE — 700111 HCHG RX REV CODE 636 W/ 250 OVERRIDE (IP): Mod: JZ | Performed by: SURGERY

## 2024-06-09 RX ORDER — CYCLOBENZAPRINE HCL 10 MG
10 TABLET ORAL 3 TIMES DAILY PRN
Qty: 15 TABLET | Refills: 0 | Status: SHIPPED | OUTPATIENT
Start: 2024-06-09

## 2024-06-09 RX ORDER — DOCUSATE SODIUM 100 MG/1
100 CAPSULE, LIQUID FILLED ORAL 2 TIMES DAILY PRN
COMMUNITY
Start: 2024-06-09

## 2024-06-09 RX ORDER — ONDANSETRON 4 MG/1
4 TABLET, ORALLY DISINTEGRATING ORAL EVERY 8 HOURS PRN
Qty: 6 TABLET | Refills: 0 | Status: SHIPPED | OUTPATIENT
Start: 2024-06-09

## 2024-06-09 RX ORDER — CELECOXIB 200 MG/1
200 CAPSULE ORAL 2 TIMES DAILY
Qty: 14 CAPSULE | Refills: 0 | Status: SHIPPED | OUTPATIENT
Start: 2024-06-09 | End: 2024-06-16

## 2024-06-09 RX ORDER — OXYCODONE HYDROCHLORIDE 5 MG/1
5 TABLET ORAL EVERY 6 HOURS PRN
Qty: 12 TABLET | Refills: 0 | Status: SHIPPED | OUTPATIENT
Start: 2024-06-09 | End: 2024-06-12

## 2024-06-09 RX ORDER — GABAPENTIN 100 MG/1
100 CAPSULE ORAL EVERY 8 HOURS
Qty: 15 CAPSULE | Refills: 0 | Status: SHIPPED | OUTPATIENT
Start: 2024-06-09 | End: 2024-06-14

## 2024-06-09 RX ADMIN — GABAPENTIN 100 MG: 100 CAPSULE ORAL at 13:21

## 2024-06-09 RX ADMIN — METAXALONE 800 MG: 800 TABLET ORAL at 05:22

## 2024-06-09 RX ADMIN — ONDANSETRON 4 MG: 2 INJECTION INTRAMUSCULAR; INTRAVENOUS at 13:24

## 2024-06-09 RX ADMIN — OXYCODONE HYDROCHLORIDE 10 MG: 10 TABLET ORAL at 05:19

## 2024-06-09 RX ADMIN — METAXALONE 800 MG: 800 TABLET ORAL at 13:22

## 2024-06-09 RX ADMIN — DOCUSATE SODIUM 100 MG: 100 CAPSULE, LIQUID FILLED ORAL at 05:20

## 2024-06-09 RX ADMIN — KETOROLAC TROMETHAMINE 15 MG: 15 INJECTION, SOLUTION INTRAMUSCULAR; INTRAVENOUS at 05:21

## 2024-06-09 RX ADMIN — KETOROLAC TROMETHAMINE 15 MG: 15 INJECTION, SOLUTION INTRAMUSCULAR; INTRAVENOUS at 13:21

## 2024-06-09 RX ADMIN — ACETAMINOPHEN 1000 MG: 500 TABLET, FILM COATED ORAL at 13:22

## 2024-06-09 RX ADMIN — GABAPENTIN 100 MG: 100 CAPSULE ORAL at 05:20

## 2024-06-09 ASSESSMENT — PAIN DESCRIPTION - PAIN TYPE
TYPE: SURGICAL PAIN

## 2024-06-09 NOTE — DISCHARGE INSTRUCTIONS
Post Operative Discharge Instructions:    1. DIET: Upon discharge from the hospital, you may resume your normal preoperative diet, unless specifically directed otherwise. Depending on how you are feeling and whether you have nausea or not, you may wish to stay with a bland diet for the first few days. However, you can advance this as quickly as you feel ready.    2. ACTIVITIES: After discharge from the hospital, you may resume full routine activities; however, there should be no heavy lifting (greater than 20 pounds or a bag of groceries) and no strenuous activities for at least 2 weeks. The duration may be longer, depending on your surgical procedure. Routine activities of daily living are acceptable. Activity level should be addressed at your post-op follow up appointment.    3. DRIVING: You may drive whenever you are off pain medications and are able to perform the activities needed to drive, i.e., turning, bending, twisting, etc.    4. BATHING: You may get the wound wet at any time after leaving the hospital. You may shower, but do not submerge in a bath for at least two weeks.  If you have wound dressings, they may come off after 48 hours.  If you have skin glue to the wound, this will fall off on its own, do not pick at it.  If you have Steri-Strips to the wound, these will fall off on their own, do not pick at them. You may trim the edges if needed.    5. BOWEL FUNCTION:   After surgery, it is not uncommon for patients to develop either frequent or loose stools after meals. This usually corrects itself after a few days, to a few weeks. If this occurs, do not worry; this will resolve on its own.  Constipation is much more common than loose stools. The cause is the combination of pain medication and decreased activity level and possibly the nature of the surgical procedure performed. If you feel this is occurring, you may use an over-the-counter treatment such as MiraLAX (or Milk of Magnesia, Ex-Lax,  Senokot, etc.) until the problem has resolved. Drink plenty of water and try to wean off narcotic pain medications as soon as is comfortable for you.    6. PAIN MEDICATION:   You will be given a prescription for pain medication at discharge. Please take these as directed. It is important to remember not to take medications on an empty stomach as this may cause nausea.  You may also take over the counter acetaminophen and/or NSAIDS (ibuprofen, Aleve, Advil, Motrin) per the package instructions.  You may also use ice to the wound to decrease pain and swelling. You may alternate 20 minutes on and 20 minutes off with the ice for the first 24-48 hours. Make sure you place a washcloth or towel between the ice pack and your skin.  Please note that narcotic pain medication cannot be refilled unless you are seen by a doctor. Make sure you call the office if you are running low on medication or if the dose you have been prescribed is not working well for you.    7.CALL THE [place] [facility] AT [phone] IF YOU HAVE:  (1) Fevers to more than 101F, (2) Unusual chest or leg pain, (3) Drainage or fluid from incision that may be foul smelling, increased tenderness or soreness at the wound or the wound edges are no longer together, redness or swelling at the incision site. Do not hesitate to call with any other questions.           Discharge Instructions    Discharged to home by car with relative. Discharged via wheelchair, hospital escort: Yes.  Special equipment needed: Not Applicable    Be sure to schedule a follow-up appointment with your primary care doctor or any specialists as instructed.     Discharge Plan:   Diet Plan: Discussed  Activity Level: Discussed  Confirmed Follow up Appointment: Patient to Call and Schedule Appointment  Confirmed Symptoms Management: Discussed  Medication Reconciliation Updated: Yes    I understand that a diet low in cholesterol, fat, and sodium is recommended for good health. Unless I  have been given specific instructions below for another diet, I accept this instruction as my diet prescription.   Other diet: Regular    Special Instructions: None    -Is this patient being discharged with medication to prevent blood clots?  No    Is patient discharged on Warfarin / Coumadin?   No

## 2024-06-09 NOTE — PROGRESS NOTES
Assessment completed. Pt A&Ox4, French speaking, able to communicate with this RN. Respirations are even and unlabored on RA. Pt reports 4/10 LUQ pain at this time, states pain was in RUQ earlier. Monitors applied, VS stable, call light and belongings within reach. POC updated (pain control, ambulate). Pt educated on room and call light, pt verbalized understanding. Communication board updated. Needs met.  at bedside.

## 2024-06-09 NOTE — PROGRESS NOTES
Report received from [facility] RN. Assume care. Pt is AAOx4.Central African speaking only, RN fluent in Central African.  Assessment completed. VSS. Denies pain, fully ambulatory, and Pt was strongly encourage to ambulate about hallways.  Pt was update for the care for the day. White board updated, All question answered. Pt has call light within reach,  bed is in the lowest position. Pt has no other needs at this time.

## 2024-06-09 NOTE — CARE PLAN
The patient is Stable - Low risk of patient condition declining or worsening    Shift Goals  Clinical Goals: ambulation x 5  Patient Goals: rest  Family Goals: feel better    Progress made toward(s) clinical / shift goals:  yes, she was ambulating about unit several tines and able to release gas    Patient is not progressing towards the following goals:

## 2024-06-09 NOTE — DISCHARGE SUMMARY
DISCHARGE  SUMMARY    DATE OF ADMISSION: 6/8/2024    DATE OF DISCHARGE: 6/9/2024    DISCHARGE DIAGNOSIS:  Principal Problem:    Post-operative pain  Resolved Problems:    * No resolved hospital problems. *      CONSULTATIONS:  None     PROCEDURES:  None     BRIEF HPI and HOSPITAL COURSE:  38-year-old female presented to emergency department for persistent acute postoperative pain after undergoing laparoscopic cholecystectomy on 6/5/2024.  On admission, her CT findings were consistent with acute postoperative changes.  Her laboratory findings were benign.  The patient is afebrile.  She is able to tolerate a regular diet and is passing gas.  I do recommend that she continue outpatient bowel regimen and drink plenty of fluid.  Her last bowel movement was on 6/7/2024.  She was sent home with a multimodal pain regimen.  On day of discharge, she has a slight elevation of white blood cell count, however all other labs are unremarkable.  Her abdomen is appropriately tender and nondistended.  Her vital signs are stable.  She will have close follow-up in ACS clinic on this coming Tuesday, 6/11/2024.  She will have outpatient laboratory studies drawn tomorrow.  Plan was discussed with patient.    DISPOSITION:   Discharged home on 6/9/2024. The patient was counseled and questions were answered. Specifically, signs and symptoms of infection, respiratory decompensation, and persistent or worsening pain were discussed and the patient agrees to seek medical attention if any of these develop.    DISCHARGE MEDICATIONS:  The patients controlled substance history was reviewed and a controlled substance use informed consent (if applicable) was provided by [facility] and the patient has been prescribed.     Medication List        START taking these medications        Instructions   celecoxib 200 MG Caps  Commonly known as: CeleBREX   Take 1 Capsule by mouth 2 times a day for 7 days.  Dose: 200 mg      cyclobenzaprine 10 mg Tabs  Commonly known as: Flexeril   Take 1 Tablet by mouth 3 times a day as needed for Moderate Pain or Muscle Spasms.  Dose: 10 mg     docusate sodium 100 MG Caps  Commonly known as: Colace   Take 1 Capsule by mouth 2 times a day as needed for Constipation.  Dose: 100 mg     gabapentin 100 MG Caps  Commonly known as: Neurontin   Take 1 Capsule by mouth every 8 hours for 5 days.  Dose: 100 mg            CHANGE how you take these medications        Instructions   oxyCODONE immediate-release 5 MG Tabs  What changed: when to take this  Commonly known as: Roxicodone   Take 1 Tablet by mouth every 6 hours as needed for Severe Pain for up to 3 days.  Dose: 5 mg            CONTINUE taking these medications        Instructions   acetaminophen 500 MG Tabs  Commonly known as: Tylenol   Take 1,000 mg by mouth 1 time a day as needed for Mild Pain. 1,000 mg = 2 tabs  Dose: 1,000 mg     ondansetron 4 MG Tbdp  Commonly known as: Zofran ODT   Take 1 Tablet by mouth every 8 hours as needed for Nausea/Vomiting.  Dose: 4 mg            You will be given a prescription for pain medication at discharge. Please take these as directed. It is important to remember not to take medications on an empty stomach as this may cause nausea.  You may also take over the counter acetaminophen and/or NSAIDS (ibuprofen, Aleve, Advil, Motrin) per the package instructions.  You may also use ice to the wound to decrease pain and swelling. You may alternate 20 minutes on and 20 minutes off with the ice for the first 24-48 hours. Make sure you place a washcloth or towel between the ice pack and your skin.  Please note that narcotic pain medication cannot be refilled unless you are seen by a doctor. Make sure you call the office if you are running low on medication or if the dose you have been prescribed is not working well for you.    ACTIVITY:  After discharge from the hospital, you may resume full routine activities; however, there  should be no heavy lifting (greater than 20 pounds or a bag of groceries) and no strenuous activities for at least 2 weeks. The duration may be longer, depending on your surgical procedure. Routine activities of daily living are acceptable. Activity level should be addressed at your post-op follow up appointment. You may drive whenever you are off pain medications and are able to perform the activities needed to drive, i.e., turning, bending, twisting, etc.      WOUND CARE:  You may shower, but do not submerge in a bath for at least two weeks. If you have wound dressings, they may come off after 48 hours. If you have skin glue to the wound, this will fall off on its own, do not pick at it.     DIET:  Upon discharge from the hospital, you may resume your normal preoperative diet, unless specifically directed otherwise. Depending on how you are feeling and whether you have nausea or not, you may wish to stay with a bland diet for the first few days. However, you can advance this as quickly as you feel ready.      FOLLOW UP:  [facility]  [address]  [facility] [address]  [phone]    Follow up in 1 week(s)  For wound re-check in ACS clinic    Call the office if you have: (1) Fevers to more than 101F, (2) Unusual chest or leg pain, (3) Drainage or fluid from incision that may be foul smelling, increased tenderness or soreness at the wound or the wound edges are no longer together, redness or swelling at the incision site. Do not hesitate to call with any other questions.    TIME SPENT ON DISCHARGE: 29 minutes      ____________________________________________  Ana Russo P.A.-C.    DD: 6/9/2024 9:53 AM

## 2024-06-09 NOTE — CARE PLAN
The patient is Stable - Low risk of patient condition declining or worsening    Shift Goals  Clinical Goals: pain control, ambulate  Patient Goals: pain relief  Family Goals: feel better    Progress made toward(s) clinical / shift goals:        Problem: Pain - Standard  Goal: Alleviation of pain or a reduction in pain to the patient’s comfort goal  Outcome: Progressing     Problem: Knowledge Deficit - Standard  Goal: Patient and family/care givers will demonstrate understanding of plan of care, disease process/condition, diagnostic tests and medications  Outcome: Progressing       Patient is not progressing towards the following goals:

## 2024-06-09 NOTE — PROGRESS NOTES
2 RN Skin Assessment Completed by Wendy RN and Maria Antonia RN.     Head: WDL  Ears: WDL  Nose: WDL  Mouth: WDL  Neck: WDL  Breasts/Chest: WDL  Shoulder Blades: WDL  Spine: WDL  (R) Arm/Elbow/hand: WDL  (L) Arm/Elbow/hand: WDL  Abdomen: 4 lap sites, CDI  Groin: WDL  Sacrum/Coccyx/Buttocks: blanching  (R) Leg: WDL  (L) Leg: WDL  (R) Heel/Foot/Toe: blanching  (L) Heel/Foot/Toe: blanching              Devices in place:  Pulse Ox     Interventions in place:  Pillows     Possible skin injury found: No     Pictures uploaded into Epic: N/A  Wound Consult Placed: N/A

## 2024-06-12 NOTE — PROGRESS NOTES
"    HISTORY OF PRESENT ILLNESS: The patient is a 38 year-old woman who returns to the [facility] for routine follow-up after undergoing a laparoscopic cholecystectomy for acute cholecystitis by Giovanni Orozco MD on 6/5/2024. The patient presented to the ED on 6/8 for persistent post operative abdominal pain and was admitted for observation and pain control. CT of abd/pelvis at that time was consistent with post operative changes. Interval CBC from 6/13 was without leukocytosis.   Since surgery, her abdominal pain has improved and she is tolerating a regular diet.  She is having RLE pain onset 2 days ago and noted on awakening. She denies injury or history of similar. No chest pain or SOB. She was seen by her PCP who ordered follow up US for July.     CURRENT MEDICATIONS:  Current Outpatient Medications   Medication Sig    gabapentin (NEURONTIN) 100 MG Cap Take 1 Capsule by mouth every 8 hours for 5 days.    cyclobenzaprine (FLEXERIL) 10 mg Tab Take 1 Tablet by mouth 3 times a day as needed for Moderate Pain or Muscle Spasms.    celecoxib (CELEBREX) 200 MG Cap Take 1 Capsule by mouth 2 times a day for 7 days.    docusate sodium (COLACE) 100 MG Cap Take 1 Capsule by mouth 2 times a day as needed for Constipation.    ondansetron (ZOFRAN ODT) 4 MG TABLET DISPERSIBLE Take 1 Tablet by mouth every 8 hours as needed for Nausea/Vomiting.    acetaminophen (TYLENOL) 500 MG Tab Take 1,000 mg by mouth 1 time a day as needed for Mild Pain. 1,000 mg = 2 tabs       PHYSICAL EXAMINATION:  Vital Signs: /72   Pulse 72   Resp 16   Ht 1.575 m (5' 2\")   Wt 80.3 kg (177 lb)   SpO2 97%   Physical Exam  Vitals reviewed.   Constitutional:       General: She is not in acute distress.     Appearance: She is not ill-appearing.   Neurological:      Mental Status: She is alert.         LABORATORY VALUES:  Lab Results   Component Value Date/Time    WBC 7.4 06/13/2024 01:25 PM    RBC 4.28 06/13/2024 01:25 PM    " "HEMOGLOBIN 12.3 06/13/2024 01:25 PM    HEMATOCRIT 38.1 06/13/2024 01:25 PM    MCV 89.0 06/13/2024 01:25 PM    MCH 28.7 06/13/2024 01:25 PM    MCHC 32.3 06/13/2024 01:25 PM    MPV 11.1 06/13/2024 01:25 PM    NEUTSPOLYS 73.80 (H) 06/13/2024 01:25 PM    LYMPHOCYTES 18.20 (L) 06/13/2024 01:25 PM    MONOCYTES 4.30 06/13/2024 01:25 PM    EOSINOPHILS 2.80 06/13/2024 01:25 PM    BASOPHILS 0.50 06/13/2024 01:25 PM     Lab Results   Component Value Date/Time    SODIUM 136 06/09/2024 09:38 AM    POTASSIUM 4.3 06/09/2024 09:38 AM    CHLORIDE 102 06/09/2024 09:38 AM    CO2 25 06/09/2024 09:38 AM    GLUCOSE 95 06/09/2024 09:38 AM    BUN 10 06/09/2024 09:38 AM    CREATININE 0.63 06/09/2024 09:38 AM    CREATININE 0.8 07/05/2007 11:15 PM    BUNCREATRAT 16.3 07/31/2022 05:53 PM     No results found for: \"PROTHROMBTM\", \"INR\"    IMAGING:  No orders to display       PATHOLOGY: Final pathology demonstrated chronic cholecystitis, cholelithiasis and focal cholesterolosis.    ASSESSMENT AND PLAN:  Satisfactory progress following a laparoscopic cholecystectomy.  Final postoperative instructions provided. Patient given additional 2 weeks off from work. [facility] paperwork is pending. Please return to clinic in 2 weeks for work release.     2. Pain of right lower extremity   - Recommend ED evaluation to rule out DVT.   - Discussed with patient life threatening risks of pulmonary embolism and advised her to been seen today and not wait for US ordered by PCP.   - Advised patient to proceed directly to ED for further evaluation of non-traumatic RLE pain s/p lap cholecystectomy. No hx of BCP use of smoking.      ____________________________________     Ana Russo P.A.-C.    DD: 6/14/2024  15:35 PM    "

## 2024-06-13 ENCOUNTER — HOSPITAL ENCOUNTER (OUTPATIENT)
Dept: LAB | Facility: MEDICAL CENTER | Age: 39
End: 2024-06-13
Attending: PHYSICIAN ASSISTANT
Payer: COMMERCIAL

## 2024-06-13 DIAGNOSIS — G89.18 POST-OPERATIVE PAIN: ICD-10-CM

## 2024-06-13 DIAGNOSIS — K80.00 CALCULUS OF GALLBLADDER WITH ACUTE CHOLECYSTITIS WITHOUT OBSTRUCTION: ICD-10-CM

## 2024-06-13 LAB
BASOPHILS # BLD AUTO: 0.5 % (ref 0–1.8)
BASOPHILS # BLD: 0.04 K/UL (ref 0–0.12)
EOSINOPHIL # BLD AUTO: 0.21 K/UL (ref 0–0.51)
EOSINOPHIL NFR BLD: 2.8 % (ref 0–6.9)
ERYTHROCYTE [DISTWIDTH] IN BLOOD BY AUTOMATED COUNT: 46.6 FL (ref 35.9–50)
HCT VFR BLD AUTO: 38.1 % (ref 37–47)
HGB BLD-MCNC: 12.3 G/DL (ref 12–16)
IMM GRANULOCYTES # BLD AUTO: 0.03 K/UL (ref 0–0.11)
IMM GRANULOCYTES NFR BLD AUTO: 0.4 % (ref 0–0.9)
LYMPHOCYTES # BLD AUTO: 1.35 K/UL (ref 1–4.8)
LYMPHOCYTES NFR BLD: 18.2 % (ref 22–41)
MCH RBC QN AUTO: 28.7 PG (ref 27–33)
MCHC RBC AUTO-ENTMCNC: 32.3 G/DL (ref 32.2–35.5)
MCV RBC AUTO: 89 FL (ref 81.4–97.8)
MONOCYTES # BLD AUTO: 0.32 K/UL (ref 0–0.85)
MONOCYTES NFR BLD AUTO: 4.3 % (ref 0–13.4)
NEUTROPHILS # BLD AUTO: 5.46 K/UL (ref 1.82–7.42)
NEUTROPHILS NFR BLD: 73.8 % (ref 44–72)
NRBC # BLD AUTO: 0 K/UL
NRBC BLD-RTO: 0 /100 WBC (ref 0–0.2)
PLATELET # BLD AUTO: 324 K/UL (ref 164–446)
PMV BLD AUTO: 11.1 FL (ref 9–12.9)
RBC # BLD AUTO: 4.28 M/UL (ref 4.2–5.4)
WBC # BLD AUTO: 7.4 K/UL (ref 4.8–10.8)

## 2024-06-13 PROCEDURE — 36415 COLL VENOUS BLD VENIPUNCTURE: CPT

## 2024-06-13 PROCEDURE — 85025 COMPLETE CBC W/AUTO DIFF WBC: CPT

## 2024-06-14 ENCOUNTER — HOSPITAL ENCOUNTER (EMERGENCY)
Facility: MEDICAL CENTER | Age: 39
End: 2024-06-14
Attending: EMERGENCY MEDICINE
Payer: COMMERCIAL

## 2024-06-14 ENCOUNTER — APPOINTMENT (OUTPATIENT)
Dept: RADIOLOGY | Facility: MEDICAL CENTER | Age: 39
End: 2024-06-14
Attending: EMERGENCY MEDICINE
Payer: COMMERCIAL

## 2024-06-14 ENCOUNTER — OFFICE VISIT (OUTPATIENT)
Dept: SURGERY | Facility: MEDICAL CENTER | Age: 39
End: 2024-06-14
Attending: SURGERY
Payer: COMMERCIAL

## 2024-06-14 VITALS
HEART RATE: 72 BPM | BODY MASS INDEX: 32.57 KG/M2 | SYSTOLIC BLOOD PRESSURE: 120 MMHG | OXYGEN SATURATION: 97 % | WEIGHT: 177 LBS | HEIGHT: 62 IN | RESPIRATION RATE: 16 BRPM | DIASTOLIC BLOOD PRESSURE: 72 MMHG

## 2024-06-14 VITALS
WEIGHT: 178.79 LBS | DIASTOLIC BLOOD PRESSURE: 71 MMHG | BODY MASS INDEX: 32.9 KG/M2 | HEIGHT: 62 IN | TEMPERATURE: 96.8 F | SYSTOLIC BLOOD PRESSURE: 110 MMHG | HEART RATE: 78 BPM | OXYGEN SATURATION: 99 % | RESPIRATION RATE: 16 BRPM

## 2024-06-14 DIAGNOSIS — Z90.49 STATUS POST LAPAROSCOPIC CHOLECYSTECTOMY: ICD-10-CM

## 2024-06-14 DIAGNOSIS — M79.661 PAIN OF RIGHT LOWER LEG: ICD-10-CM

## 2024-06-14 DIAGNOSIS — M79.604 PAIN OF RIGHT LOWER EXTREMITY: ICD-10-CM

## 2024-06-14 PROCEDURE — 700102 HCHG RX REV CODE 250 W/ 637 OVERRIDE(OP): Performed by: EMERGENCY MEDICINE

## 2024-06-14 PROCEDURE — 93971 EXTREMITY STUDY: CPT | Mod: RT

## 2024-06-14 PROCEDURE — 99024 POSTOP FOLLOW-UP VISIT: CPT | Performed by: PHYSICIAN ASSISTANT

## 2024-06-14 PROCEDURE — A9270 NON-COVERED ITEM OR SERVICE: HCPCS | Performed by: EMERGENCY MEDICINE

## 2024-06-14 PROCEDURE — 99283 EMERGENCY DEPT VISIT LOW MDM: CPT

## 2024-06-14 RX ORDER — ACETAMINOPHEN 325 MG/1
650 TABLET ORAL ONCE
Status: COMPLETED | OUTPATIENT
Start: 2024-06-14 | End: 2024-06-14

## 2024-06-14 RX ADMIN — ACETAMINOPHEN 650 MG: 325 TABLET, FILM COATED ORAL at 18:03

## 2024-06-14 ASSESSMENT — FIBROSIS 4 INDEX
FIB4 SCORE: 0.46
FIB4 SCORE: 0.46

## 2024-06-14 ASSESSMENT — PAIN DESCRIPTION - PAIN TYPE: TYPE: ACUTE PAIN

## 2024-06-14 NOTE — ED TRIAGE NOTES
Chief Complaint   Patient presents with    Leg Pain     R lower leg pain post op cholecystectomy.  Pt states pain x 2-3 days.  Sent by Surgeons office for r/o DVT

## 2024-06-15 NOTE — ED NOTES
Pt discharged to home. Discharge paperwork provided. Education provided by ERP. Reinforced discharge instructions.  Pt was given follow up instructions   Pt verbalized understanding of all instructions for discharge.   Patient went out of the ER ambulatory with steady gait., alert and oriented x 4, with all belongings.     Family to drive pt home

## 2024-06-15 NOTE — ED PROVIDER NOTES
ED Provider Note    CHIEF COMPLAINT  Chief Complaint   Patient presents with    Leg Pain     R lower leg pain post op cholecystectomy.  Pt states pain x 2-3 days.  Sent by Surgeons office for r/o DVT       EXTERNAL RECORDS REVIEWED  Outpatient Notes patient was seen by general surgery for her postop appointment today status post laparoscopic cholecystectomy for acute cholecystitis by Dr. Hernandez for 6/5/2024 she did describe pain in her right lower extremity and was sent here for evaluation to rule out a DVT status post surgery.  Her abdominal pain was improving.    HPI/ROS  LIMITATION TO HISTORY   Select: Language Grenadian,  Used   OUTSIDE HISTORIAN(S):  Significant other the patient's  speaks English and helps interpret for the patient    Kiet Pollack is a 38 y.o. female who presents from her surgeons office for evaluation for right lower extremity swelling.  The patient is postop cholecystectomy on 6/5/2024.  During her postop appointment she states that she has been having pain in her right leg for the last 2 to 3 days and denies any falls trauma or injury.  She states she has had some swelling.  She denies any chest pains or shortness of breath.  She does not smoke.  She is here for evaluation for possible DVT.    PAST MEDICAL HISTORY   has a past medical history of Pyelonephritis complicating pregnancy (2000).    SURGICAL HISTORY   has a past surgical history that includes rigo by laparoscopy (N/A, 6/5/2024).    FAMILY HISTORY  Family History   Problem Relation Age of Onset    Diabetes Father     No Known Problems Mother     Cancer Maternal Grandfather         lung    Cancer Paternal Grandmother         bones    No Known Problems Sister     No Known Problems Brother        SOCIAL HISTORY  Social History     Tobacco Use    Smoking status: Never    Smokeless tobacco: Never   Vaping Use    Vaping status: Never Used   Substance and Sexual Activity    Alcohol use: No    Drug use: No    Sexual  "activity: Yes     Partners: Male     Comment: None       CURRENT MEDICATIONS  Home Medications       Reviewed by Alfonso Burt R.N. (Registered Nurse) on 06/14/24 at 1602  Med List Status: <None>     Medication Last Dose Status   acetaminophen (TYLENOL) 500 MG Tab  Active   celecoxib (CELEBREX) 200 MG Cap  Active   cyclobenzaprine (FLEXERIL) 10 mg Tab  Active   docusate sodium (COLACE) 100 MG Cap  Active   gabapentin (NEURONTIN) 100 MG Cap  Active   ondansetron (ZOFRAN ODT) 4 MG TABLET DISPERSIBLE  Active                  Audit from Redirected Encounters    **Home medications have not yet been reviewed for this encounter**         ALLERGIES  No Known Allergies    PHYSICAL EXAM  VITAL SIGNS: /68   Pulse 77   Temp 36.5 °C (97.7 °F) (Temporal)   Resp 16   Ht 1.575 m (5' 2\")   Wt 81.1 kg (178 lb 12.7 oz)   LMP  (LMP Unknown)   SpO2 100%   BMI 32.70 kg/m²      Constitutional: No distress  Skin: Pink warm and dry no contusions or abrasions or erythema  Musculoskeletal: 1+ edema and tenseness to the patient's right calf without venous cording or Homans' sign normal pulses normal sensation and normal range of motion  Vascular: warm to touch good capillary refill   Neurologic: distally neurovascularly intact  Psychiatric: Affect normal      EKG/LABS  None  I have independently interpreted this EKG    RADIOLOGY/PROCEDURES   I have independently interpreted the diagnostic imaging associated with this visit and am waiting the final reading from the radiologist.       Radiologist interpretation:  US-EXTREMITY VENOUS LOWER UNILAT RIGHT         No DVT    COURSE & MEDICAL DECISION MAKING    ASSESSMENT, COURSE AND PLAN  Care Narrative: Kiet Pollack is a 38 y.o. female who presents from her surgeons office for evaluation for right lower extremity swelling.  The patient is postop cholecystectomy on 6/5/2024.  During her postop appointment she states that she has been having pain in her right leg for the last 2 to 3 " days and denies any falls trauma or injury.  She states she has had some swelling.  She denies any chest pains or shortness of breath.  She does not smoke.  She is here for evaluation for possible DVT.  On physical exam her incisions are clean dry and intact and abdomen is soft though she does look slightly uncomfortable.  She has 1+ edema right lower extremity with some tenseness in her right calf no venous cording or Homans' sign she has normal pulses and normal cap refill.            ADDITIONAL PROBLEMS MANAGED  Pain management    DISPOSITION AND DISCUSSIONS    I ordered a duplex ultrasound of the patient's right lower extremity and it was negative for any DVT.  The patient appears better after the Tylenol.  I will discharge her home in stable condition I reassured her that she does not have a blood clot and is to perform gentle range of motion and stretching exercises and take Tylenol as needed for pain and elevate her leg.  She will be discharged home in stable condition.  I have discussed management of the patient with the following physicians and KAI's: None    Discussion of management with other QHP or appropriate source(s): None     Escalation of care considered, and ultimately not performed:none    Barriers to care at this time, including but not limited to: Patient does not have established PCP.     Decision tools and prescription drugs considered including, but not limited to: Pain Medications Tylenol .      The patient will return for new or worsening symptoms and is stable at the time of discharge.    The patient is referred to a primary physician for blood pressure management, diabetic screening, and for all other preventative health concerns.        DISPOSITION:  Patient will be discharged home in stable condition.    FOLLOW UP:  [facility], Emergency Dept  [address] [address] [phone]    As needed, If symptoms worsen      OUTPATIENT MEDICATIONS:  New  Prescriptions    No medications on file       FINAL DIAGNOSIS  1. Pain of right lower leg           Electronically signed by: Peace Arora M.D., 6/14/2024 5:55 PM

## 2024-06-28 ENCOUNTER — OFFICE VISIT (OUTPATIENT)
Dept: SURGERY | Facility: MEDICAL CENTER | Age: 39
End: 2024-06-28
Attending: SURGERY
Payer: COMMERCIAL

## 2024-06-28 VITALS
WEIGHT: 180 LBS | SYSTOLIC BLOOD PRESSURE: 120 MMHG | HEART RATE: 78 BPM | RESPIRATION RATE: 16 BRPM | BODY MASS INDEX: 32.92 KG/M2 | OXYGEN SATURATION: 99 % | DIASTOLIC BLOOD PRESSURE: 70 MMHG

## 2024-06-28 DIAGNOSIS — Z90.49 STATUS POST LAPAROSCOPIC CHOLECYSTECTOMY: ICD-10-CM

## 2024-06-28 ASSESSMENT — ENCOUNTER SYMPTOMS: ABDOMINAL PAIN: 0

## 2024-06-28 ASSESSMENT — FIBROSIS 4 INDEX: FIB4 SCORE: 0.46

## 2024-06-28 NOTE — PROGRESS NOTES
Subjective     Kiet Pollack is a 38 y.o. female who presents with Post-op (Lap rigo)            HPI Since Dc, doing better. Minimal pain.     Review of Systems   Gastrointestinal:  Negative for abdominal pain.              Objective     LMP  (LMP Unknown)      Physical Exam  Pulmonary:      Effort: Pulmonary effort is normal.   Abdominal:      General: There is no distension.      Palpations: Abdomen is soft.      Tenderness: There is no abdominal tenderness.      Comments: Incisions healing well.   Skin:     General: Skin is warm.   Neurological:      General: No focal deficit present.      Mental Status: She is alert.                  Path - Chronic rigo           Assessment & Plan        SP lap rigo  FU PRN

## 2025-06-29 ENCOUNTER — HOSPITAL ENCOUNTER (EMERGENCY)
Facility: MEDICAL CENTER | Age: 40
End: 2025-06-30
Attending: EMERGENCY MEDICINE
Payer: COMMERCIAL

## 2025-06-29 ENCOUNTER — APPOINTMENT (OUTPATIENT)
Dept: RADIOLOGY | Facility: MEDICAL CENTER | Age: 40
End: 2025-06-29
Attending: EMERGENCY MEDICINE
Payer: COMMERCIAL

## 2025-06-29 DIAGNOSIS — S50.01XA CONTUSION OF RIGHT ELBOW, INITIAL ENCOUNTER: ICD-10-CM

## 2025-06-29 DIAGNOSIS — S93.509A SPRAIN OF TOE, INITIAL ENCOUNTER: ICD-10-CM

## 2025-06-29 DIAGNOSIS — T07.XXXA ABRASIONS OF MULTIPLE SITES: Primary | ICD-10-CM

## 2025-06-29 PROCEDURE — 700102 HCHG RX REV CODE 250 W/ 637 OVERRIDE(OP): Performed by: EMERGENCY MEDICINE

## 2025-06-29 PROCEDURE — 73080 X-RAY EXAM OF ELBOW: CPT | Mod: RT

## 2025-06-29 PROCEDURE — A9270 NON-COVERED ITEM OR SERVICE: HCPCS

## 2025-06-29 PROCEDURE — 700102 HCHG RX REV CODE 250 W/ 637 OVERRIDE(OP)

## 2025-06-29 PROCEDURE — 99284 EMERGENCY DEPT VISIT MOD MDM: CPT

## 2025-06-29 PROCEDURE — A9270 NON-COVERED ITEM OR SERVICE: HCPCS | Performed by: EMERGENCY MEDICINE

## 2025-06-29 PROCEDURE — 73501 X-RAY EXAM HIP UNI 1 VIEW: CPT | Mod: RT

## 2025-06-29 PROCEDURE — 73660 X-RAY EXAM OF TOE(S): CPT | Mod: LT

## 2025-06-29 RX ORDER — HYDROCODONE BITARTRATE AND ACETAMINOPHEN 5; 325 MG/1; MG/1
1 TABLET ORAL ONCE
Refills: 0 | Status: COMPLETED | OUTPATIENT
Start: 2025-06-29 | End: 2025-06-29

## 2025-06-29 RX ORDER — ACETAMINOPHEN 325 MG/1
650 TABLET ORAL ONCE
Status: COMPLETED | OUTPATIENT
Start: 2025-06-29 | End: 2025-06-29

## 2025-06-29 RX ADMIN — ACETAMINOPHEN 650 MG: 325 TABLET ORAL at 20:49

## 2025-06-29 RX ADMIN — HYDROCODONE BITARTRATE AND ACETAMINOPHEN 1 TABLET: 5; 325 TABLET ORAL at 22:30

## 2025-06-29 ASSESSMENT — FIBROSIS 4 INDEX: FIB4 SCORE: 0.47

## 2025-06-30 VITALS
TEMPERATURE: 98 F | RESPIRATION RATE: 16 BRPM | OXYGEN SATURATION: 97 % | BODY MASS INDEX: 34.08 KG/M2 | HEART RATE: 74 BPM | SYSTOLIC BLOOD PRESSURE: 113 MMHG | WEIGHT: 185.19 LBS | HEIGHT: 62 IN | DIASTOLIC BLOOD PRESSURE: 74 MMHG

## 2025-06-30 NOTE — ED TRIAGE NOTES
Patient to ED with complaints of GLF. Tripped and scraped right arm and right lower abdomen on asphalt. Last tetanus was 2017.  Denies head strike.     Pt educated on ED process and asked to wait in lobby. Patient educated on importance of alerting staff to new or worsening symptoms or concerns.

## 2025-06-30 NOTE — DISCHARGE INSTRUCTIONS
Follow-up with Dr. Williamson, orthopedic surgeon, early this coming week.  Please call tomorrow to schedule an appointment.    Take over-the-counter Tylenol and ibuprofen for discomfort.    Wear your sling until you are seen by the orthopedic surgeon.  However, while you are wearing your sling, be sure that you take your arm out of the sling several times a day and move your shoulder and your elbow around.  This will help prevent frozen shoulder and frozen elbow.    Return to the ER for any signs of infection around the abrasions (redness, drainage of pus, increased swelling, increased pain, red streaks extending from the wounds).  Also return for any worsening elbow pain, worsening knee pain, worsening hip pain, numbness/tingling/weakness of extremities, or for any concerns.    Be sure that you keep your wounds clean and covered.  Perform dressing changes twice a day using bacitracin antibiotic ointment.

## 2025-06-30 NOTE — ED NOTES
"Pt discharged home. Pt in possession of belongings. Pt provided discharge education and information pertaining to medications and follow up appointments. Pt received copy of discharge instructions and verbalized understanding. /74   Pulse 74   Temp 36.7 °C (98 °F) (Temporal)   Resp 16   Ht 1.575 m (5' 2\")   Wt 84 kg (185 lb 3 oz)   SpO2 97%   BMI 33.87 kg/m²     "

## 2025-06-30 NOTE — ED NOTES
Pt ambulatory to [facility] with a slow but steady gait. Agree with triage note. Chart up for ERP.

## 2025-06-30 NOTE — ED PROVIDER NOTES
ED Provider Note    CHIEF COMPLAINT  Chief Complaint   Patient presents with    T-5000    Abrasion       EXTERNAL RECORDS REVIEWED  Inpatient Notes patient had a laparoscopic cholecystectomy done in June 2024. Her cholecystectomy was done June 5, 2024.  She came back to the ER on June 8 for persistent postoperative pain and was admitted for observation and pain control.    HPI/ROS  LIMITATION TO HISTORY   Select: : None  OUTSIDE HISTORIAN(S):  Friend provides translation and history as noted below.    Kiet Pollack is a 39 y.o. female who presents to the ER complaining of abrasions to right arm, right lower abdomen, right knee, and right hip after she tripped today while running in sandals and fell onto the asphalt.  Incident occurred up at the  department.  The patient said she was running towards her significant other, who was driving away in his vehicle.  He had just done an exchange of his children with his ex-wife.  The patient says she was running towards his car as he was driving away because he had forgotten a birthday present that he was supposed to give to his 7-year-old child during the exchange with his ex.  As the patient was running towards the significant other's car as he was driving weight, she caught the edge of her sandal, tripped, and fell forward.  The police/ were on scene as it happened in the parking lot of the  department where the child exchange happens.  The significant other says that the patient is not supposed to have any contact with his ex, and that is why they do the child exchange at the  office.  He said they were cameras everywhere.  Patient was not run over by the car.  She was not struck by the car.  Paramedics arrived on scene and bandaged up her wounds.  They offered to take her to the hospital but she declined.  Patient's last tetanus shot was 1 year ago.  She did not hit her head.  No LOC.  No neck pain or back pain.  No chest pain or rib pain.  " No abdominal pain other than the area of the abrasion in the right lower abdominal pannus.  She is able to walk.  She complains of some pain to her left great toe.  Patient has history of anxiety.    PAST MEDICAL HISTORY   has a past medical history of Pyelonephritis complicating pregnancy (2000).    SURGICAL HISTORY   has a past surgical history that includes rigo by laparoscopy (N/A, 6/5/2024).    FAMILY HISTORY  Family History   Problem Relation Age of Onset    Diabetes Father     No Known Problems Mother     Cancer Maternal Grandfather         lung    Cancer Paternal Grandmother         bones    No Known Problems Sister     No Known Problems Brother        SOCIAL HISTORY  Social History     Tobacco Use    Smoking status: Never    Smokeless tobacco: Never   Vaping Use    Vaping status: Never Used   Substance and Sexual Activity    Alcohol use: No    Drug use: No    Sexual activity: Yes     Partners: Male     Comment: None       CURRENT MEDICATIONS  Home Medications    **Home medications have not yet been reviewed for this encounter**       Audit from Redirected Encounters    **Home medications have not yet been reviewed for this encounter**         ALLERGIES  Allergies[1]    PHYSICAL EXAM  VITAL SIGNS: BP (!) 143/89   Pulse 83   Temp 37 °C (98.6 °F) (Temporal)   Resp 18   Ht 1.575 m (5' 2\")   Wt 84 kg (185 lb 3 oz)   SpO2 96%   BMI 33.87 kg/m²      Constitutional: Very anxious.  Tearful.  HENT: Normocephalic, Bilateral external ears normal. Nose normal.   Eyes: Pupils are equal and reactive. Conjunctiva normal, non-icteric.   Thorax & Lungs: Easy unlabored respirations  Skin: Visualized skin is  Dry, No erythema, patient has multiple abrasions over the right elbow and proximal forearm.  There is a skin avulsion with a small amount of exposed fat presents in the area of the abrasion which is nonsuturable and will need to heal by secondary intention.  She has abrasions over the right lower abdominal " pannus.  She has a abrasion over the lateral aspect of the right knee.  She also has abrasion over the right posterior hip and buttock.  Extremities: Right upper extremity: There is some mild diffuse tenderness over the right elbow.  Patient has some pain with flexion, extension, pronation and supination of the right elbow.  Wrist and hand are nontender.  Shoulder is nontender.  Full range of motion to the right shoulder without any limitation of movement.  2+ radial pulse.  Full range of motion digits.  Sensation is intact to light touch.  Right great toe.  There are some mild tenderness at the IP joint.  Nail is intact.  No other tenderness.  Neurologic: Alert, clear speech.  Normal gait.  Psychiatric: Affect and Mood normal      RADIOLOGY/PROCEDURES   I have independently interpreted the diagnostic imaging associated with this visit and am waiting the final reading from the radiologist.     My preliminary interpretation is as follows: ER MD is reviewed the patient's x-rays.  No obvious fractures.  Small amount of superficial debris seen on the elbow x-ray.    Radiologist interpretation:  DX-HIP-UNILATERAL-WITH PELVIS-1 VIEW RIGHT    (Results Pending)   DX-TOE(S) 2+ LEFT    (Results Pending)   DX-ELBOW-COMPLETE 3+ RIGHT    (Results Pending)       COURSE & MEDICAL DECISION MAKING    ASSESSMENT, COURSE AND PLAN  Care Narrative: Patient presents to the ER with complaint of multiple abrasions.  She was running towards her 's car and tripped over her sandal and fell onto the asphalt.  Did not strike her head.  No LOC.  No headache.  No neck pain.  No back pain.  No rib pain or abdominal pain.  She complains of multiple abrasions, mostly to her right elbow and forearm as well as her right lower abdominal pannus and right hip and buttock.  The patient is up-to-date on her immunizations.  She does not have any suturable wounds.  The abrasions were carefully examined.  There were a few small speckles of superficial  dirt which were wiped away by ERMD.  The abrasions were dressed by the tech using nonstick dressing and bacitracin.  X-rays of the elbow was performed and is negative.  Patient will be in a sling and will need to follow-up with orthopedics for her elbow injury she did have pain with range of motion of the elbow.  She is ambulatory.  There is a small abrasion over the lateral aspect of the right knee and the right hip.  Hip x-ray is negative.  No bony tenderness to the knee and she is weightbearing without any pain in the knee while she ambulates.  At this time I think she is safe and stable for outpatient management discharge home.  She has been given strict return precautions and discharge instructions and understands treatment plan and follow-up.      DISPOSITION AND DISCUSSIONS  I have discussed management of the patient with the following physicians and KAI's: None    Discussion of management with other QHP or appropriate source(s): None     Escalation of care considered, and ultimately not performed:diagnostic imaging.  Patient has no complaint of rib pain or abdominal pain.  No neck pain or back pain.  No head strike.  No need for CT imaging.    Barriers to care at this time, including but not limited to: Patient does not have established PCP.     Decision tools and prescription drugs considered including, but not limited to: Patient will need to use antibiotic ointment on the abrasions until they heal up..    FINAL DIAGNOSIS  1. Abrasions of multiple sites Acute   2. Sprain of toe, initial encounter Acute        This dictation has been created using voice recognition software. The accuracy of the dictation is limited by the abilities of the software. I expect there may be some errors of grammar and possibly content. I made every attempt to manually correct the errors within my dictation. However, errors related to voice recognition software may still exist and should be interpreted within the appropriate  context.     Electronically signed by: Lorna Neal M.D., 6/29/2025 9:32 PM           [1] No Known Allergies

## (undated) DEVICE — SUTURE 4-0 MONOCRYL PLUS PS-2 - 27 INCH (36/BX)

## (undated) DEVICE — SET LEADWIRE 5 LEAD BEDSIDE DISPOSABLE ECG (1SET OF 5/EA)

## (undated) DEVICE — TROCAR 5X100 NON BLADED Z-TH - READ KII (6/BX)

## (undated) DEVICE — TUBING CLEARLINK DUO-VENT - C-FLO (48EA/CA)

## (undated) DEVICE — CANISTER SUCTION 3000ML MECHANICAL FILTER AUTO SHUTOFF MEDI-VAC NONSTERILE LF DISP  (40EA/CA)

## (undated) DEVICE — GOWN WARMING STANDARD FLEX - (30/CA)

## (undated) DEVICE — SLEEVE VASO CALF MED - (10PR/CA)

## (undated) DEVICE — SODIUM CHL. INJ. 0.9% 500ML (24EA/CA 50CA/PF)

## (undated) DEVICE — CLIP MED LG INTNL HRZN TI ESCP - (20/BX)

## (undated) DEVICE — PACK LAP CHOLE OR - (2EA/CA)

## (undated) DEVICE — SET EXTENSION WITH 2 PORTS (48EA/CA) ***PART #2C8610 IS A SUBSTITUTE*****

## (undated) DEVICE — CANNULA W/SEAL 5X100 Z-THRE - ADED KII (12/BX)

## (undated) DEVICE — SENSOR OXIMETER ADULT SPO2 RD SET (20EA/BX)

## (undated) DEVICE — SET SUCTION/IRRIGATION WITH DISPOSABLE TIP (6/CA )PART #0250-070-520 IS A SUB

## (undated) DEVICE — TOWEL STOP TIMEOUT SAFETY FLAG (40EA/CA)

## (undated) DEVICE — CANNULA O2 COMFORT SOFT EAR ADULT 7 FT TUBING (50/CA)

## (undated) DEVICE — NEEDLE INSFL 120MM 14GA VRRS - (20/BX)

## (undated) DEVICE — LACTATED RINGERS INJ 1000 ML - (14EA/CA 60CA/PF)

## (undated) DEVICE — KIT  I.V. START (100EA/CA)

## (undated) DEVICE — SUTURE 0 VICRYL PLUS UR-6 - 27 INCH (36/BX)

## (undated) DEVICE — GLOVE BIOGEL PI ORTHO SZ 8 PF LF (40PR/BX)

## (undated) DEVICE — MASK OXYGEN VNYL ADLT MED CONC WITH 7 FOOT TUBING  - (50EA/CA)

## (undated) DEVICE — SODIUM CHL IRRIGATION 0.9% 1000ML (12EA/CA)

## (undated) DEVICE — COVER LIGHT HANDLE ALC PLUS DISP (18EA/BX)

## (undated) DEVICE — SUTURE GENERAL

## (undated) DEVICE — TROCAR Z THREAD11MM OPTICAL - NON BLADED(6/BX)